# Patient Record
Sex: MALE | Race: WHITE | NOT HISPANIC OR LATINO | Employment: UNEMPLOYED | URBAN - METROPOLITAN AREA
[De-identification: names, ages, dates, MRNs, and addresses within clinical notes are randomized per-mention and may not be internally consistent; named-entity substitution may affect disease eponyms.]

---
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
## 2021-10-19 ENCOUNTER — APPOINTMENT (OUTPATIENT)
Dept: BEHAVIORAL HEALTH | Facility: PSYCHIATRIC FACILITY | Age: 21
End: 2021-10-19
Payer: COMMERCIAL

## 2021-11-02 ENCOUNTER — OFFICE VISIT (OUTPATIENT)
Dept: BEHAVIORAL HEALTH | Facility: PSYCHIATRIC FACILITY | Age: 21
End: 2021-11-02
Payer: COMMERCIAL

## 2021-11-02 VITALS — WEIGHT: 160 LBS

## 2021-11-02 DIAGNOSIS — F41.1 GENERALIZED ANXIETY DISORDER: ICD-10-CM

## 2021-11-02 DIAGNOSIS — F31.64 SEVERE MIXED BIPOLAR I DISORDER WITH PSYCHOTIC FEATURES (HCC): ICD-10-CM

## 2021-11-02 PROBLEM — Z51.81 ENCOUNTER FOR THERAPEUTIC DRUG MONITORING: Status: ACTIVE | Noted: 2021-06-15

## 2021-11-02 PROBLEM — G47.00 INSOMNIA: Status: ACTIVE | Noted: 2021-01-05

## 2021-11-02 PROCEDURE — 99999 PR NO CHARGE: CPT | Performed by: STUDENT IN AN ORGANIZED HEALTH CARE EDUCATION/TRAINING PROGRAM

## 2021-11-02 RX ORDER — LAMOTRIGINE 200 MG/1
200 TABLET ORAL DAILY
COMMUNITY
Start: 2021-10-14 | End: 2021-11-02 | Stop reason: SDUPTHER

## 2021-11-02 RX ORDER — LITHIUM CARBONATE 300 MG/1
300 TABLET, FILM COATED, EXTENDED RELEASE ORAL
COMMUNITY
Start: 2021-10-06 | End: 2021-11-02 | Stop reason: SDUPTHER

## 2021-11-02 RX ORDER — QUETIAPINE 200 MG/1
200 TABLET, FILM COATED, EXTENDED RELEASE ORAL DAILY
Qty: 30 TABLET | Refills: 1 | Status: SHIPPED | OUTPATIENT
Start: 2021-11-02 | End: 2022-02-14 | Stop reason: SDUPTHER

## 2021-11-02 RX ORDER — QUETIAPINE 200 MG/1
200 TABLET, FILM COATED, EXTENDED RELEASE ORAL DAILY
COMMUNITY
Start: 2021-10-15 | End: 2021-11-02 | Stop reason: SDUPTHER

## 2021-11-02 RX ORDER — QUETIAPINE FUMARATE 400 MG/1
400 TABLET, FILM COATED ORAL
COMMUNITY
Start: 2021-09-21 | End: 2021-11-02 | Stop reason: SDUPTHER

## 2021-11-02 RX ORDER — QUETIAPINE FUMARATE 400 MG/1
400 TABLET, FILM COATED ORAL
Qty: 30 TABLET | Refills: 1 | Status: SHIPPED | OUTPATIENT
Start: 2021-11-02 | End: 2022-02-03

## 2021-11-02 RX ORDER — LAMOTRIGINE 200 MG/1
200 TABLET ORAL DAILY
Qty: 30 TABLET | Refills: 0 | Status: SHIPPED | OUTPATIENT
Start: 2021-11-02 | End: 2022-02-14 | Stop reason: SDUPTHER

## 2021-11-02 RX ORDER — LITHIUM CARBONATE 300 MG/1
300 TABLET, FILM COATED, EXTENDED RELEASE ORAL
Qty: 30 TABLET | Refills: 0 | Status: SHIPPED | OUTPATIENT
Start: 2021-11-02 | End: 2022-01-03

## 2021-11-02 NOTE — PROGRESS NOTES
"[facility]  Medication Management Note    Evaluation completed by: Alan Foley D.O.   Date of Service: 11/02/21   Appointment type: in-office appointment.        CHIEF COMPLAINT/REASON FOR VISIT  Medication management and follow-up    HISTORY OF PRESENT ILLNESS  Jerome La is a 20 y.o. old male who presents today for follow up management of bipolar and anxiety.   Pt was last seen on 9/21/2021, at which time the plan was to take Seroquel X are 20 mg daily in the morning and Seroquel  mg at bedtime, with Lamictal 200 mg daily for mood, and lithium  mg at bedtime for mood.  Also plan to start propranolol 10 mg as needed for anxiety related to going to work.  Patient reports that he has been effectively working for the past month and a half.  Has had no issues or behavioral outbursts at work.  He gets along with his coworkers and has had no anger outbursts.  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  Reports work as being stressful, but likes helping customers.  Does not like when his coworkers do not listen to them and does not like a change in routine and his works schedule.  Reports difficulty struggling to adapt to change when there is no structure provided.    Patient took propranolol 2 or 3 times prior to work for his anxiety, he reports that it helped, but he states that his anxiety is in anticipation of working as soon as he walks to the door leaves.  Thus he reports stopping propranolol as he feels like he does not need it.  Patient discusses benefits of stopping Lamictal.  Discussed with patient that this is a mood stabilization medication, and has helped reduce periods of elevated or irritable mood with associated agitation.  Patient states that he is also considering therapy.  Discussed therapy with " patient, offered him therapy here at [facility], patient asked for other options.  Provided resource sheet with other therapy options.  Patient states he will think about starting therapy here at [facility] or elsewhere.  Patient denies any current homicidal thoughts.      PSYCHOSOCIAL CHANGES SINCE LAST VISIT   Patient has been working at [employer] for the past month and a half.    CURRENT MEDICATIONS, ADHERENCE, AND SIDE EFFECTS   • Seroquel  mg every morning and Seroquel  mg nightly  • Lamictal 200 mg daily  • Lithium  mg nightly      PSYCHIATRIC REVIEW OF SYSTEMS  Depression: Denies depression, reports good mood  Angelita: Denies elevated/irritable mood, grandiosity, increased goal-directed activity, decreased need for sleep, increased agitation or aggression  Psychosis: No auditory or visual hallucinations  Anxiety: Patient is anxious about going to work, anxiety improves when he walks through the door.        MEDICAL REVIEW OF SYSTEMS  ROS      ALLERGIES  No Known Allergies     PAST PSYCHIATRIC HISTORY  Past outpatient psychiatrist: Dr. Obrien at [facility]  Therapy: Has had therapy in the past, states it was helpful  Hospitalizations: [place] x2 in 2018.  One time for 1 night.  One time for 2 weeks.  These were both related to increased agitation and aggression and threats of homicide.  Suicide attempts: 1 in 2018    SOCIAL HISTORY SUMMARY  Lives with mother and brother in an apartment  Developmental history: Normal, was homeschooled  Has close friends in [place], not many here  Education: Was homeschooled but graduated high school.  Employment: Is currently working at [employer], this is his first job.  Wants to direct movies.  Currently saving money for a film camera.  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   No  "past surgical history on file.         FAMILY PSYCHIATRIC HISTORY  Dad is bipolar (not diagnosed), grandma has borderline personality disorder, cousin with schizophrenia, brother has self-harm/depression/ADHD.  Cousin and aunt have attempted suicide.    FAMILY MEDICAL HISTORY  Dad had heart problems and stroke    PHYSICAL EXAMINATION  Vital signs: Wt 72.6 kg (160 lb)   Musculoskeletal: Gait is normal. No gross abnormalities noted.   Abnormal movements: Were not noted    MENTAL STATUS EXAMINATION    General: Jerome La appears stated age.  And exhibits grooming which is appropriate and casual.  Hygiene is good.  Patient has dark brown shoulderlength hair.  Behavior: Pt is calm and cooperative with interview.  In no apparent distress.  Eye contact is poor.  Psychomotor: Psychomotor agitation or retardation is not noted.  Tics or tremors are not noted.  Speech: Moderate rate, tone.  Loud volume  Language: Fluent English  Mood: \"Fine\".  Per patient report  Affect: Flexible, Full range and Congruent with content  Thought Process: Logical and Goal-directed  Thought Content: denies suicidal ideation, denies homicidal ideation. Within normal limits  Perception: denies auditory hallucinations, denies visual hallucinations. No delusions noted on interview.    Attention span and concentration: Attentive to interview  Orientation: Alert  Recent and remote memory: No gross evidence of memory deficits  Insight: Good  Judgment: Good         CURRENT RISK ASSESSMENT       Suicide: Low       Homicide: Low       Self-Harm: Low       Relapse: Not applicable       Crisis Safety Plan Reviewed Not Indicated    [place]   reviewed.  No concerns about misuse of controlled substance.    ASSESSMENT  Jerome La is a 20 y.o. old male presenting for follow up management of bipolar disorder and anxiety.  Patient recently started his first job, and outpatient anxiety is increased prior to work, it is improved when he is at work " and busy working.  He has had significant behavioral outbursts of aggression in the past, these have not surfaced while at work.  He has had compliments given to him by his boss indicating good and adequate performance.  Patient's considerations for reducing medications taken are noted, however explained the patient that with recent job and potential change in looking for new job I am hesitant to change his current scheduled medication regimen as he has been having success in areas where he has not in the past.  Patient is in agreement, and agrees we should not change medication.    Today, will plan to continue current medication regimen.    DIAGNOSES/PLAN  Problem 1: Bipolar 1 disorder  • Medications: Lamictal 200 mg daily.  Seroquel  mg every morning and Seroquel  mg nightly.  Lithium  mg p.m.  • Psychotherapy: Patient is considering therapy, would like to explore alternative therapy sources outside of [facility].  We will follow-up at next visit if he would like to establish therapy at this clinic or otherwise.  • Labs/studies: We will order CMP, CBC, TSH, and lithium level      Problem 2: Generalized anxiety disorder  • Plan as above.  Will stop propranolol.        • Medication options, alternatives (including no medications) and medication risks/benefits/side effects were discussed in detail.  • The patient was advised to call, message clinician on [facility], or come in to the clinic if symptoms worsen or if questions/issues regarding their medications arise.  The patient verbalized understanding and agreement.    • The patient was educated to call 911, call the suicide hotline, or go to the local ER if having thoughts of suicide or homicide.  The patient verbalized understanding and agreement.   • The proposed treatment plan was discussed with the patient who was provided the opportunity to ask questions and make suggestions regarding alternative treatment. Patient verbalized understanding and  expressed agreement with the plan.      Return to clinic in 4 weeks or sooner if symptoms worsen.    This appointment was supervised by attending psychiatrist, Amilcar Tai MD, who agrees with assessment and treatment plan.  See attending attestation for more details.       Alan Foley D.O.  11/02/21

## 2022-01-03 DIAGNOSIS — F31.64 SEVERE MIXED BIPOLAR I DISORDER WITH PSYCHOTIC FEATURES (HCC): ICD-10-CM

## 2022-01-03 RX ORDER — LITHIUM CARBONATE 300 MG/1
TABLET, FILM COATED, EXTENDED RELEASE ORAL
Qty: 30 TABLET | Refills: 0 | Status: SHIPPED | OUTPATIENT
Start: 2022-01-03 | End: 2022-02-01

## 2022-02-01 DIAGNOSIS — F31.64 SEVERE MIXED BIPOLAR I DISORDER WITH PSYCHOTIC FEATURES (HCC): ICD-10-CM

## 2022-02-01 RX ORDER — LITHIUM CARBONATE 300 MG/1
TABLET, FILM COATED, EXTENDED RELEASE ORAL
Qty: 30 TABLET | Refills: 0 | Status: SHIPPED | OUTPATIENT
Start: 2022-02-01 | End: 2022-02-14 | Stop reason: SDUPTHER

## 2022-02-02 DIAGNOSIS — F31.64 SEVERE MIXED BIPOLAR I DISORDER WITH PSYCHOTIC FEATURES (HCC): ICD-10-CM

## 2022-02-03 RX ORDER — QUETIAPINE FUMARATE 400 MG/1
TABLET, FILM COATED ORAL
Qty: 30 TABLET | Refills: 1 | Status: SHIPPED | OUTPATIENT
Start: 2022-02-03 | End: 2022-02-14 | Stop reason: SDUPTHER

## 2022-02-14 ENCOUNTER — OFFICE VISIT (OUTPATIENT)
Dept: BEHAVIORAL HEALTH | Facility: PSYCHIATRIC FACILITY | Age: 22
End: 2022-02-14
Payer: COMMERCIAL

## 2022-02-14 VITALS — WEIGHT: 162.4 LBS

## 2022-02-14 DIAGNOSIS — Z51.81 ENCOUNTER FOR THERAPEUTIC DRUG MONITORING: ICD-10-CM

## 2022-02-14 DIAGNOSIS — F41.1 GENERALIZED ANXIETY DISORDER: ICD-10-CM

## 2022-02-14 DIAGNOSIS — F31.64 SEVERE MIXED BIPOLAR I DISORDER WITH PSYCHOTIC FEATURES (HCC): ICD-10-CM

## 2022-02-14 PROCEDURE — 99214 OFFICE O/P EST MOD 30 MIN: CPT | Mod: GC | Performed by: STUDENT IN AN ORGANIZED HEALTH CARE EDUCATION/TRAINING PROGRAM

## 2022-02-14 RX ORDER — LITHIUM CARBONATE 300 MG/1
300 TABLET, FILM COATED, EXTENDED RELEASE ORAL
Qty: 30 TABLET | Refills: 0 | Status: SHIPPED | OUTPATIENT
Start: 2022-02-14 | End: 2022-03-14 | Stop reason: SDUPTHER

## 2022-02-14 RX ORDER — LAMOTRIGINE 200 MG/1
200 TABLET ORAL DAILY
Qty: 30 TABLET | Refills: 0 | Status: SHIPPED | OUTPATIENT
Start: 2022-02-14 | End: 2022-04-18 | Stop reason: SDUPTHER

## 2022-02-14 RX ORDER — QUETIAPINE 200 MG/1
200 TABLET, FILM COATED, EXTENDED RELEASE ORAL DAILY
Qty: 30 TABLET | Refills: 1 | Status: SHIPPED | OUTPATIENT
Start: 2022-02-14 | End: 2022-04-18 | Stop reason: SDUPTHER

## 2022-02-14 RX ORDER — QUETIAPINE FUMARATE 400 MG/1
400 TABLET, FILM COATED ORAL
Qty: 30 TABLET | Refills: 1 | Status: SHIPPED | OUTPATIENT
Start: 2022-02-14 | End: 2022-03-14

## 2022-02-14 ASSESSMENT — ENCOUNTER SYMPTOMS
WEAKNESS: 0
DEPRESSION: 0
SHORTNESS OF BREATH: 0
DIARRHEA: 0
HEADACHES: 0
DIZZINESS: 0
CHILLS: 0
CONSTIPATION: 0
LOSS OF CONSCIOUSNESS: 0
ABDOMINAL PAIN: 0
HALLUCINATIONS: 0
MYALGIAS: 0
SEIZURES: 0
FEVER: 0
PALPITATIONS: 0
NAUSEA: 0
NERVOUS/ANXIOUS: 0

## 2022-02-15 NOTE — PROGRESS NOTES
"[facility]  Medication Management Note    Evaluation completed by: Alan Foley D.O.   Date of Service: 2/14/22   Appointment type: in-office appointment.        CHIEF COMPLAINT/REASON FOR VISIT  Medication management and follow-up    HISTORY OF PRESENT ILLNESS  Jerome La is a 21 y.o. old male who presents today for follow up management of bipolar and anxiety.   Pt was last seen on 11/2/2021, at which time the plan was to continue Seroquel XR are 200 mg in the morning and Seroquel  mg at bedtime, with Lamictal 200 mg daily for mood, and lithium  mg at bedtime for mood.      Pt quit work in December. He was becoming very upset and angry with his coworkers and \"it wasn't worth my time\". He is spending his time doing other things like \"music, art, and writing\". He quite because he was anxious. Quitting helped his anxiety. He did not get his lab work done, he has an appointment scheduled for later this week.   Pt has been taking a reduced amount of Seroquel in the evenings. \"My anxiety and shit is so bad right now\". He is frustrated with his mental health issues. He feels like he is not supporting/contributing. His mom is supportive. Patient denies AVH. He denies current SI and denies HI.    PSYCHOSOCIAL CHANGES SINCE LAST VISIT   Patient quit work in December 2021. Has not been working since. Doesn't know what he would like to do.    CURRENT MEDICATIONS, ADHERENCE, AND SIDE EFFECTS   • Seroquel  mg   • Seroquel  mg nightly, has been taking 200mg (instead of 400mg)  • Lamictal 200 mg daily  • Lithium  mg nightly      PSYCHIATRIC REVIEW OF SYSTEMS  Depression: Denies depression, reports good mood  Angelita: is more irritable and more easily upset; denies grandiosity, increased goal-directed activity, decreased need for sleep. No physical or verbal aggression.  Psychosis: No auditory or visual hallucinations  Anxiety: Patient is anxious, this is " non-specific  PTSD: no trauma      MEDICAL REVIEW OF SYSTEMS  Review of Systems   Constitutional: Negative for chills and fever.   HENT: Negative for tinnitus.    Respiratory: Negative for shortness of breath.    Cardiovascular: Negative for chest pain and palpitations.   Gastrointestinal: Negative for abdominal pain, constipation, diarrhea and nausea.   Musculoskeletal: Negative for joint pain and myalgias.   Neurological: Negative for dizziness, seizures, loss of consciousness, weakness and headaches.   Psychiatric/Behavioral: Negative for depression, hallucinations and suicidal ideas. The patient is not nervous/anxious.          ALLERGIES  No Known Allergies     PAST PSYCHIATRIC HISTORY  Past outpatient psychiatrist: Dr. Obrien at [facility]  Therapy: Has had therapy in the past, states it was helpful  Hospitalizations: [place] x2 in 2018.  One time for 1 night.  One time for 2 weeks.  These were both related to increased agitation and aggression and threats of homicide.  Suicide attempts: 1 in 2018    SOCIAL HISTORY SUMMARY  Lives with mother and brother in an apartment  Developmental history: Normal, was homeschooled  Has close friends in [place], not many here  Education: Was homeschooled but graduated high school.  Employment: Is currently working at [employer], this is his first job.  Wants to direct movies.  Currently saving money for a film camera.  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   History reviewed. No pertinent surgical history.         FAMILY PSYCHIATRIC HISTORY  Dad is bipolar (not diagnosed), grandma has borderline personality disorder, cousin with schizophrenia, brother has self-harm/depression/ADHD.  Cousin and aunt have attempted suicide.    FAMILY MEDICAL HISTORY  Dad had heart problems and stroke    PHYSICAL  "EXAMINATION  Vital signs: Wt 73.7 kg (162 lb 6.4 oz)   Musculoskeletal: Gait is normal. No gross abnormalities noted.   Abnormal movements: Were not noted    MENTAL STATUS EXAMINATION    General: Jerome La appears stated age.  And exhibits grooming which is appropriate and casual.  Hygiene is good.  Patient has dark brown shoulderlength hair.  Behavior: Pt is mostly calm and cooperative with interview.  Not in acute distress.  Eye contact is poor.  Psychomotor: Appears somewhat agitated, is gesticulating with his hands.  Tics or tremors are not noted.  Speech: Moderate rate, tone.  Loud volume  Language: Fluent English  Mood: \"Complex...Embarassment\".  Per patient report  Affect: Flexible, Full range and Congruent with content  Thought Process: Logical and Goal-directed  Thought Content: denies suicidal ideation, denies homicidal ideation. Within normal limits  Perception: denies auditory hallucinations, denies visual hallucinations. No delusions noted on interview.    Attention span and concentration: Attentive to interview  Orientation: Alert  Recent and remote memory: No gross evidence of memory deficits  Insight: Good  Judgment: Good         CURRENT RISK ASSESSMENT       Suicide: Low       Homicide: Low       Self-Harm: Low       Relapse: Not applicable       Crisis Safety Plan Reviewed Not Indicated    [place]  records   [place].  No concerns about misuse of controlled substance.    ASSESSMENT  Jerome La is a 20 y.o. old male presenting for follow up management of bipolar disorder and anxiety.  Patient quit his job as he became increasingly frustrated with his interactions with others. It is evident that pt may have a somewhat labile mood that is sensitive to his perceptions of how others view him. Pt began self decreasing Seroquel since our last appointment and has noted increased anxiety, agitation, and mood instability. He has been taking other medications as prescribed. Presents today as a " little more labile and irritable. It would be beneficial to see pt on a more regular basis and for longer amounts of time, I conveyed this to pt today.     Today, will plan to continue current medication regimen. Instructed pt to continue to take Seroquel 400mg at night in addition to the 200mg of XR formulation he takes in the morning.  Pt is at high risk of relapse/zafar with discontinuation of medication thus will consider any changes to medication carefully with pts full and informed consent. Added lithium level to pts baseline labs.    DIAGNOSES/PLAN  Problem 1: Bipolar 1 disorder  • Medications: Lamictal 200 mg daily.  Seroquel  mg every morning and Seroquel  mg nightly.  Lithium  mg p.m.  •   • Labs/studies: CMP, CBC, TSH, and lithium level      Problem 2: Generalized anxiety disorder  • Plan as above.      • Medication options, alternatives (including no medications) and medication risks/benefits/side effects were discussed in detail.  • The patient was advised to call, message clinician on [facility], or come in to the clinic if symptoms worsen or if questions/issues regarding their medications arise.  The patient verbalized understanding and agreement.    • The patient was educated to call 911, call the suicide hotline, or go to the local ER if having thoughts of suicide or homicide.  The patient verbalized understanding and agreement.   • The proposed treatment plan was discussed with the patient who was provided the opportunity to ask questions and make suggestions regarding alternative treatment. Patient verbalized understanding and expressed agreement with the plan.      Return to clinic in 4 weeks or sooner if symptoms worsen.    This appointment was supervised by attending psychiatrist, Mitchel South MD, who agrees with assessment and treatment plan.  See attending attestation for more details.       Alan Foley D.O.  2/14/22

## 2022-03-14 ENCOUNTER — OFFICE VISIT (OUTPATIENT)
Dept: BEHAVIORAL HEALTH | Facility: PSYCHIATRIC FACILITY | Age: 22
End: 2022-03-14
Payer: COMMERCIAL

## 2022-03-14 VITALS — WEIGHT: 163.4 LBS

## 2022-03-14 DIAGNOSIS — F31.64 SEVERE MIXED BIPOLAR I DISORDER WITH PSYCHOTIC FEATURES (HCC): ICD-10-CM

## 2022-03-14 DIAGNOSIS — F41.1 GENERALIZED ANXIETY DISORDER: ICD-10-CM

## 2022-03-14 PROCEDURE — 99214 OFFICE O/P EST MOD 30 MIN: CPT | Performed by: STUDENT IN AN ORGANIZED HEALTH CARE EDUCATION/TRAINING PROGRAM

## 2022-03-14 RX ORDER — LITHIUM CARBONATE 300 MG/1
300 TABLET, FILM COATED, EXTENDED RELEASE ORAL
Qty: 30 TABLET | Refills: 0 | Status: SHIPPED | OUTPATIENT
Start: 2022-03-14 | End: 2022-04-18 | Stop reason: SDUPTHER

## 2022-03-14 RX ORDER — QUETIAPINE FUMARATE 200 MG/1
200 TABLET, FILM COATED ORAL NIGHTLY
Qty: 30 TABLET | Refills: 0 | Status: SHIPPED | OUTPATIENT
Start: 2022-03-14 | End: 2022-04-11

## 2022-03-14 RX ORDER — PROPRANOLOL HYDROCHLORIDE 10 MG/1
10 TABLET ORAL PRN
Qty: 20 TABLET | Refills: 0 | Status: SHIPPED | OUTPATIENT
Start: 2022-03-14 | End: 2022-04-18 | Stop reason: SDUPTHER

## 2022-03-14 ASSESSMENT — ENCOUNTER SYMPTOMS
NERVOUS/ANXIOUS: 0
ABDOMINAL PAIN: 0
WEAKNESS: 0
HALLUCINATIONS: 0
SHORTNESS OF BREATH: 0
SEIZURES: 0
PALPITATIONS: 0
CONSTIPATION: 0
DIARRHEA: 0
DEPRESSION: 0
FEVER: 0
NAUSEA: 0
HEADACHES: 0
MYALGIAS: 0
LOSS OF CONSCIOUSNESS: 0
CHILLS: 0
DIZZINESS: 0

## 2022-03-14 NOTE — PROGRESS NOTES
[facility]  Medication Management Note    Evaluation completed by: Alan Foley D.O.   Date of Service: 3/14/22   Appointment type: in-office appointment.        CHIEF COMPLAINT/REASON FOR VISIT  Medication management and follow-up    HISTORY OF PRESENT ILLNESS  Jerome La is a 21 y.o. old male who presents today for follow up management of bipolar and anxiety.   Pt was last seen on 2/14/2022, at which time the plan was to continue Seroquel XR are 200 mg in the morning and Seroquel  mg at bedtime, with Lamictal 200 mg daily for mood, and lithium  mg at bedtime for mood.      Patient mother, Halle, is present for this interview with patient's consent.  States she is concerned that South is not communicating properly his medications and there effects.  Patient reports that he had not been taking 400 mg of Seroquel at bedtime prior to the last appointment, and that when he took 400 mg after last appointment he felt very tired, fatigued, and lazy.  Halle indicated he has been taking Seroquel  mg in the morning and Seroquel  mg in p.m. states he also clarifies that patient is taking 100 mg of lamotrigine daily, not 200 mg as indicated by patient previously.  Patient is taking 300 mg of lithium at night.    Patient and mother both note that he has been anxious about a variety of medical problems lately.  He has been anxious about many of these issues and is wanting to set up appointments with other doctors.  He has been concerned about his blood tests and what they may result.  He notes that his anxiety is heightened before any activity when he leaves the house and this is been getting worse lately.  Mother states he notes that it is generally worse as he leaves the house. He would like to consider medication that may help with this.    Patient denies AVH. He denies current SI and denies HI.    PSYCHOSOCIAL CHANGES SINCE LAST VISIT   Patient quit work in December  2021. Has not been working since. Doesn't know what he would like to do.    CURRENT MEDICATIONS, ADHERENCE, AND SIDE EFFECTS   • Seroquel  mg   • Seroquel  mg nightly, has been taking 200mg   • Lamictal 100 mg daily  • Lithium  mg nightly      PSYCHIATRIC REVIEW OF SYSTEMS  Depression: Denies depression, reports good mood  Angelita: is more irritable and more easily upset; denies grandiosity, increased goal-directed activity, decreased need for sleep. No physical or verbal aggression.  Psychosis: No auditory or visual hallucinations  Anxiety: Patient is anxious, recently regarding medical issues and leaving house  PTSD: no trauma      MEDICAL REVIEW OF SYSTEMS  Review of Systems   Constitutional: Negative for chills and fever.   HENT: Negative for tinnitus.    Respiratory: Negative for shortness of breath.    Cardiovascular: Negative for chest pain and palpitations.   Gastrointestinal: Negative for abdominal pain, constipation, diarrhea and nausea.   Musculoskeletal: Negative for joint pain and myalgias.   Neurological: Negative for dizziness, seizures, loss of consciousness, weakness and headaches.   Psychiatric/Behavioral: Negative for depression, hallucinations and suicidal ideas. The patient is not nervous/anxious.          ALLERGIES  No Known Allergies     PAST PSYCHIATRIC HISTORY  Past outpatient psychiatrist: Dr. Obrien at [facility]  Therapy: Has had therapy in the past, states it was helpful  Hospitalizations: [facility] x2 in 2018.  One time for 1 night.  One time for 2 weeks.  These were both related to increased agitation and aggression and threats of homicide.  Suicide attempts: 1 in 2018    SOCIAL HISTORY SUMMARY  Lives with mother and brother in an apartment  Developmental history: Normal, was homeschooled  Has close friends in [place], not many here  Education: Was homeschooled but graduated high school.  Employment: Is currently working at [employer], this is his first job.  Wants to direct  "movies.  Currently saving money for a film camera.  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   No past surgical history on file.         FAMILY PSYCHIATRIC HISTORY  Dad is bipolar (not diagnosed), grandma has borderline personality disorder, cousin with schizophrenia, brother has self-harm/depression/ADHD.  Cousin and aunt have attempted suicide.    FAMILY MEDICAL HISTORY  Dad had heart problems and stroke    PHYSICAL EXAMINATION  Vital signs: There were no vitals taken for this visit.  Musculoskeletal: Gait is normal. No gross abnormalities noted.   Abnormal movements: Were not noted    MENTAL STATUS EXAMINATION    General: Jerome La appears stated age.  And exhibits grooming which is appropriate and casual.  Hygiene is good.  Patient has dark brown shoulderlength hair.  Behavior: Pt is mostly calm and cooperative with interview.  Not in acute distress.  Eye contact is poor.  Psychomotor: Appears somewhat agitated, is gesticulating with his hands.  Tics or tremors are not noted.  Speech: Moderate rate, tone.  Loud volume  Language: Fluent English  Mood: \"Okay\".  Per patient report  Affect: Flexible, Full range and Congruent with content  Thought Process: Logical and Goal-directed  Thought Content: denies suicidal ideation, denies homicidal ideation. Within normal limits  Perception: denies auditory hallucinations, denies visual hallucinations. No delusions noted on interview.    Attention span and concentration: Attentive to interview  Orientation: Alert  Recent and remote memory: No gross evidence of memory deficits  Insight: Good  Judgment: Good         CURRENT RISK ASSESSMENT       Suicide: Low       Homicide: Low       Self-Harm: Low       Relapse: Not applicable       Crisis Safety Plan Reviewed Not Indicated    [place]  " records   reviewed.  No concerns about misuse of controlled substance.    ASSESSMENT  Jerome La is a 20 y.o. old male presenting for follow up management of bipolar disorder and anxiety.  Patient with difficulty remembering medications that he is taking, his mother likely helps ensure he takes his medications correctly.  Mother attended appointment today and clarified how much of each medication he is taking.  We will adjust medications accordingly as patient's incorrect reporting of medication resulted in patient having excessive fatigue.    Today, will plan to continue to take Seroquel IR 200mg at night in addition to the 200mg of XR formulation he takes in the morning.  We will continue lamotrigine at 100 mg as this is what he has been taking according to his mother.  And will continue lithium  mg.  We will add propranolol 10 mg to be taken as needed before event that causes anxiety.    DIAGNOSES/PLAN  Problem 1: Bipolar 1 disorder  • Medications:   a. Continue Lamictal 100 mg daily.    b. ContinueSeroquel  mg every morning and Seroquel  mg nightly.    c. Continue Lithium  mg p.m.  •       Problem 2: Generalized anxiety disorder  • Plan as above.    • Additionally we will start propranolol 10 mg to be taken as needed 1 hour before event that anticipates will cause anxiety.      Most recent labs done on 3/8/22 and showed Lithium level <0.3, this is lower than typical therapeutic range, and is expected as Lithium was added as an adjunctive therapy for mood stability. All other labs obtained were within normal limits.      • Medication options, alternatives (including no medications) and medication risks/benefits/side effects were discussed in detail.  • The patient was advised to call, message clinician [facility], or come in to the clinic if symptoms worsen or if questions/issues regarding their medications arise.  The patient verbalized understanding and agreement.    • The  patient was educated to call 911, call the suicide hotline, or go to the local ER if having thoughts of suicide or homicide.  The patient verbalized understanding and agreement.   • The proposed treatment plan was discussed with the patient who was provided the opportunity to ask questions and make suggestions regarding alternative treatment. Patient verbalized understanding and expressed agreement with the plan.      Return to clinic in 4 weeks or sooner if symptoms worsen.    This appointment was supervised by attending psychiatrist, Mitchel South MD, who agrees with assessment and treatment plan.  See attending attestation for more details.       Alan Foley D.O.  3/14/22

## 2022-04-09 DIAGNOSIS — F31.64 SEVERE MIXED BIPOLAR I DISORDER WITH PSYCHOTIC FEATURES (HCC): ICD-10-CM

## 2022-04-11 ENCOUNTER — APPOINTMENT (OUTPATIENT)
Dept: BEHAVIORAL HEALTH | Facility: PSYCHIATRIC FACILITY | Age: 22
End: 2022-04-11
Payer: COMMERCIAL

## 2022-04-11 RX ORDER — QUETIAPINE FUMARATE 200 MG/1
TABLET, FILM COATED ORAL
Qty: 30 TABLET | Refills: 0 | Status: SHIPPED | OUTPATIENT
Start: 2022-04-11 | End: 2022-04-18 | Stop reason: SDUPTHER

## 2022-04-18 ENCOUNTER — OFFICE VISIT (OUTPATIENT)
Dept: BEHAVIORAL HEALTH | Facility: PSYCHIATRIC FACILITY | Age: 22
End: 2022-04-18
Payer: COMMERCIAL

## 2022-04-18 VITALS — WEIGHT: 163.6 LBS

## 2022-04-18 DIAGNOSIS — F41.1 GENERALIZED ANXIETY DISORDER: ICD-10-CM

## 2022-04-18 DIAGNOSIS — F31.64 SEVERE MIXED BIPOLAR I DISORDER WITH PSYCHOTIC FEATURES (HCC): ICD-10-CM

## 2022-04-18 PROCEDURE — 99213 OFFICE O/P EST LOW 20 MIN: CPT | Mod: GE | Performed by: STUDENT IN AN ORGANIZED HEALTH CARE EDUCATION/TRAINING PROGRAM

## 2022-04-18 RX ORDER — LITHIUM CARBONATE 300 MG/1
300 TABLET, FILM COATED, EXTENDED RELEASE ORAL
Qty: 30 TABLET | Refills: 1 | Status: SHIPPED | OUTPATIENT
Start: 2022-04-18 | End: 2022-05-16 | Stop reason: SDUPTHER

## 2022-04-18 RX ORDER — QUETIAPINE 200 MG/1
200 TABLET, FILM COATED, EXTENDED RELEASE ORAL DAILY
Qty: 30 TABLET | Refills: 1 | Status: SHIPPED | OUTPATIENT
Start: 2022-04-18 | End: 2022-09-12

## 2022-04-18 RX ORDER — PROPRANOLOL HYDROCHLORIDE 10 MG/1
10 TABLET ORAL PRN
Qty: 30 TABLET | Refills: 1 | Status: SHIPPED | OUTPATIENT
Start: 2022-04-18 | End: 2022-07-05

## 2022-04-18 RX ORDER — LAMOTRIGINE 100 MG/1
100 TABLET ORAL DAILY
Qty: 30 TABLET | Refills: 1 | Status: SHIPPED | OUTPATIENT
Start: 2022-04-18 | End: 2022-07-05

## 2022-04-18 RX ORDER — QUETIAPINE FUMARATE 200 MG/1
200 TABLET, FILM COATED ORAL EVERY EVENING
Qty: 30 TABLET | Refills: 1 | Status: SHIPPED | OUTPATIENT
Start: 2022-04-18 | End: 2022-08-16

## 2022-04-18 ASSESSMENT — ENCOUNTER SYMPTOMS
DIZZINESS: 0
MYALGIAS: 0
PALPITATIONS: 0
SHORTNESS OF BREATH: 0
NERVOUS/ANXIOUS: 0
NAUSEA: 0
HALLUCINATIONS: 0
HEADACHES: 0
DIARRHEA: 0
LOSS OF CONSCIOUSNESS: 0
ABDOMINAL PAIN: 0
WEAKNESS: 0
FEVER: 0
CONSTIPATION: 0
DEPRESSION: 0
CHILLS: 0
SEIZURES: 0

## 2022-04-19 NOTE — PROGRESS NOTES
"[facility]  Medication Management Note    Evaluation completed by: Alan Foley D.O.   Date of Service: 4/18/2022  Appointment type: in-office appointment.        CHIEF COMPLAINT/REASON FOR VISIT  Medication management and follow-up    HISTORY OF PRESENT ILLNESS  Jerome La is a 21 y.o. old male who presents today for follow up management of bipolar and anxiety.   Pt was last seen on 3/14/2022, at which time the plan was to continue Seroquel XR are 200 mg in the morning and Seroquel  mg at bedtime, with Lamictal 100 mg daily for mood, and lithium  mg at bedtime for mood, and start propranolol 10 mg as needed.    Patient is feeling tired today.  Patient reports he has been taking propranolol nearly every day for his anxiety.  He reports that it is \"helping very much\".  His anxiety is less, rates his anxiety as 3/10, 10 being the worst.  Reports as harder for his brain to think about things that make him anxious.  Denies feeling lightheaded, dizzy, or fainting, or other side effect.  He had a follow-up appointment to test his eyes, as that was a large concern for him at last appointment.  He has 20/20 vision and is very relieved.  He has been taking other medications as prescribed at doses prescribed.  Patient is considering therapy.    Patient reports that his mood has been stable, he has not had any angry outbursts recently towards family members or others.  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  Patient has several questions about psychopathy and autism.  He thinks he might be autistic, he gets along really well with other autistic people.  Patient denies SI/HI and AVH.      PSYCHOSOCIAL CHANGES SINCE LAST VISIT   Patient quit work in December 2021. Has not been working since. Doesn't know what he would like to do.    CURRENT MEDICATIONS, ADHERENCE, AND SIDE EFFECTS   • Seroquel "  mg   • Seroquel  mg nightly   • Lamictal 100 mg daily  • Lithium  mg nightly  • Propranolol 10 mg as needed      PSYCHIATRIC REVIEW OF SYSTEMS  Depression: Denies depression, reports good mood, sleep is good  Angelita: is less irritable and upset; denies grandiosity, increased goal-directed activity, decreased need for sleep. No physical or verbal aggression.  Psychosis: No auditory or visual hallucinations  Anxiety: Patient is less anxious about medical issues, no overt anxiety causing difficulty sleeping or increased muscle tension  PTSD: no trauma      MEDICAL REVIEW OF SYSTEMS  Review of Systems   Constitutional: Negative for chills and fever.   HENT: Negative for tinnitus.    Respiratory: Negative for shortness of breath.    Cardiovascular: Negative for chest pain and palpitations.   Gastrointestinal: Negative for abdominal pain, constipation, diarrhea and nausea.   Musculoskeletal: Negative for joint pain and myalgias.   Neurological: Negative for dizziness, seizures, loss of consciousness, weakness and headaches.   Psychiatric/Behavioral: Negative for depression, hallucinations and suicidal ideas. The patient is not nervous/anxious.          ALLERGIES  No Known Allergies     PAST PSYCHIATRIC HISTORY  Past outpatient psychiatrist: Dr. Obrien at [facility]  Therapy: Has had therapy in the past, states it was helpful  Hospitalizations: [place] x2 in 2018.  One time for 1 night.  One time for 2 weeks.  These were both related to increased agitation and aggression and threats of homicide.  Suicide attempts: 1 in 2018    SOCIAL HISTORY SUMMARY  Lives with mother and brother in an apartment  Developmental history: Normal, was homeschooled  Has close friends in [place], not many here  Education: Was homeschooled but graduated high school.  Employment: Is currently working at [employer], this is his first job.  Wants to direct movies.  Currently saving money for a film camera.  Has multiple movie scripts  "written that he would like to fill  Legal history: None  Hobbies: Likes music, writing, and fell  Access to firearms: No firearms in the home      MEDICAL HISTORY  No past medical history on file.   No past surgical history on file.         FAMILY PSYCHIATRIC HISTORY  Dad is bipolar (not diagnosed), grandma has borderline personality disorder, cousin with schizophrenia, brother has self-harm/depression/ADHD.  Cousin and aunt have attempted suicide.    FAMILY MEDICAL HISTORY  Dad had heart problems and stroke    PHYSICAL EXAMINATION  Vital signs: There were no vitals taken for this visit.  Musculoskeletal: Gait is normal. No gross abnormalities noted.   Abnormal movements: Were not noted    MENTAL STATUS EXAMINATION    General: Jerome La appears stated age.  And exhibits grooming which is appropriate and casual.  Hygiene is good.  Patient has dark brown shoulder length hair.  Behavior: Pt is mostly calm and cooperative with interview.  Not in acute distress.  Eye contact is poor.  Psychomotor: Patient makes several gesticulations with his hands.  Tics or tremors are not noted.  Speech: Moderate rate, tone.  Loud volume  Language: Fluent English  Mood: \"Stable\".  Per patient report  Affect: Flexible, Full range and Congruent with content  Thought Process: Logical and Goal-directed  Thought Content: denies suicidal ideation, denies homicidal ideation. Within normal limits  Perception: denies auditory hallucinations, denies visual hallucinations. No delusions noted on interview.    Attention span and concentration: Attentive to interview  Orientation: Alert  Recent and remote memory: No gross evidence of memory deficits  Insight: Good  Judgment: Good         CURRENT RISK ASSESSMENT       Suicide: Low       Homicide: Low       Self-Harm: Low       Relapse: Not applicable       Crisis Safety Plan Reviewed Not Indicated    [place]  records   [place].  No concerns about misuse of controlled " substance.    ASSESSMENT  Jerome La is a 20 y.o. old male presenting for follow up management of bipolar disorder and anxiety.  Patient is taking all medications as currently prescribed and as noted below.  Propranolol has been effective and has helped relieve much of his anxiety.  No noted side effects of starting propranolol.    Today, will plan to continue to take Seroquel IR 200mg at night in addition to the 200mg of XR formulation he takes in the morning.  We will continue lamotrigine at 100 mg as this is what he has been taking according to his mother.  And will continue lithium  mg.  We will add propranolol 10 mg to be taken as needed before event that causes anxiety.  We will consider moving forward if patient were to decompensate or present with increased depression with plan to increase Lamictal to 200.  Can also consider increasing lithium and decreasing Seroquel.    DIAGNOSES/PLAN  Problem 1: Bipolar 1 disorder  • Medications:   a. Continue Lamictal 100 mg daily.    b. ContinueSeroquel  mg every morning and Seroquel  mg nightly.    c. Continue Lithium  mg p.m.        Problem 2: Generalized anxiety disorder  • Plan as above.    • Continue propranolol 10 mg to be taken as needed 1 hour before event that anticipates will cause anxiety.      Most recent labs done on 3/8/22 and showed Lithium level <0.3, this is lower than typical therapeutic range, and is expected as Lithium was added as an adjunctive therapy for mood stability. All other labs obtained were within normal limits.      • Medication options, alternatives (including no medications) and medication risks/benefits/side effects were discussed in detail.  • The patient was advised to call, message clinician on [facility], or come in to the clinic if symptoms worsen or if questions/issues regarding their medications arise.  The patient verbalized understanding and agreement.    • The patient was educated to call 911, call  the suicide hotline, or go to the local ER if having thoughts of suicide or homicide.  The patient verbalized understanding and agreement.   • The proposed treatment plan was discussed with the patient who was provided the opportunity to ask questions and make suggestions regarding alternative treatment. Patient verbalized understanding and expressed agreement with the plan.      Return to clinic in 4 weeks or sooner if symptoms worsen.    This appointment was supervised by attending psychiatrist, Mitchel South MD, who agrees with assessment and treatment plan.  See attending attestation for more details.       Alan Foley D.O.  4/18/2022

## 2022-05-16 ENCOUNTER — APPOINTMENT (OUTPATIENT)
Dept: BEHAVIORAL HEALTH | Facility: PSYCHIATRIC FACILITY | Age: 22
End: 2022-05-16
Payer: COMMERCIAL

## 2022-05-16 DIAGNOSIS — F31.64 SEVERE MIXED BIPOLAR I DISORDER WITH PSYCHOTIC FEATURES (HCC): ICD-10-CM

## 2022-05-16 RX ORDER — LITHIUM CARBONATE 300 MG/1
300 TABLET, FILM COATED, EXTENDED RELEASE ORAL
Qty: 30 TABLET | Refills: 1 | Status: SHIPPED | OUTPATIENT
Start: 2022-05-16 | End: 2022-09-12

## 2022-05-16 NOTE — TELEPHONE ENCOUNTER
Received request via: Patient    Was the patient seen in the last year in this department? Yes    Does the patient have an active prescription (recently filled or refills available) for medication(s) requested? No     Patient will be needing refill soon for lithium until next appointment 6/13

## 2022-06-28 DIAGNOSIS — F31.64 SEVERE MIXED BIPOLAR I DISORDER WITH PSYCHOTIC FEATURES (HCC): ICD-10-CM

## 2022-06-28 DIAGNOSIS — F41.1 GENERALIZED ANXIETY DISORDER: ICD-10-CM

## 2022-07-05 RX ORDER — PROPRANOLOL HYDROCHLORIDE 10 MG/1
TABLET ORAL
Qty: 30 TABLET | Refills: 1 | Status: SHIPPED | OUTPATIENT
Start: 2022-07-05 | End: 2023-09-20 | Stop reason: SDUPTHER

## 2022-07-05 RX ORDER — LAMOTRIGINE 100 MG/1
100 TABLET ORAL DAILY
Qty: 30 TABLET | Refills: 1 | Status: SHIPPED | OUTPATIENT
Start: 2022-07-05 | End: 2022-09-26 | Stop reason: DRUGHIGH

## 2022-08-01 ENCOUNTER — APPOINTMENT (OUTPATIENT)
Dept: BEHAVIORAL HEALTH | Facility: PSYCHIATRIC FACILITY | Age: 22
End: 2022-08-01
Payer: COMMERCIAL

## 2022-08-16 DIAGNOSIS — F31.64 SEVERE MIXED BIPOLAR I DISORDER WITH PSYCHOTIC FEATURES (HCC): ICD-10-CM

## 2022-08-16 RX ORDER — QUETIAPINE FUMARATE 200 MG/1
TABLET, FILM COATED ORAL
Qty: 90 TABLET | Refills: 1 | Status: SHIPPED | OUTPATIENT
Start: 2022-08-16 | End: 2023-02-22 | Stop reason: SDUPTHER

## 2022-09-12 ENCOUNTER — APPOINTMENT (OUTPATIENT)
Dept: BEHAVIORAL HEALTH | Facility: PSYCHIATRIC FACILITY | Age: 22
End: 2022-09-12
Payer: COMMERCIAL

## 2022-09-12 DIAGNOSIS — F31.64 SEVERE MIXED BIPOLAR I DISORDER WITH PSYCHOTIC FEATURES (HCC): ICD-10-CM

## 2022-09-12 RX ORDER — LITHIUM CARBONATE 300 MG/1
TABLET, FILM COATED, EXTENDED RELEASE ORAL
Qty: 30 TABLET | Refills: 1 | Status: SHIPPED | OUTPATIENT
Start: 2022-09-12 | End: 2022-10-11

## 2022-09-12 RX ORDER — QUETIAPINE 200 MG/1
TABLET, FILM COATED, EXTENDED RELEASE ORAL
Qty: 30 TABLET | Refills: 1 | Status: SHIPPED | OUTPATIENT
Start: 2022-09-12 | End: 2022-11-15

## 2022-09-26 ENCOUNTER — APPOINTMENT (OUTPATIENT)
Dept: BEHAVIORAL HEALTH | Facility: PSYCHIATRIC FACILITY | Age: 22
End: 2022-09-26
Payer: COMMERCIAL

## 2022-09-26 VITALS
OXYGEN SATURATION: 98 % | HEART RATE: 118 BPM | DIASTOLIC BLOOD PRESSURE: 74 MMHG | WEIGHT: 158.2 LBS | HEIGHT: 74 IN | BODY MASS INDEX: 20.3 KG/M2 | SYSTOLIC BLOOD PRESSURE: 112 MMHG

## 2022-09-26 DIAGNOSIS — F31.64 SEVERE MIXED BIPOLAR I DISORDER WITH PSYCHOTIC FEATURES (HCC): ICD-10-CM

## 2022-09-26 DIAGNOSIS — Z51.81 ENCOUNTER FOR THERAPEUTIC DRUG MONITORING: ICD-10-CM

## 2022-09-26 PROCEDURE — 99213 OFFICE O/P EST LOW 20 MIN: CPT | Mod: GE | Performed by: STUDENT IN AN ORGANIZED HEALTH CARE EDUCATION/TRAINING PROGRAM

## 2022-09-26 RX ORDER — LAMOTRIGINE 25 MG/1
TABLET ORAL
Qty: 42 TABLET | Refills: 0 | Status: SHIPPED | OUTPATIENT
Start: 2022-09-26 | End: 2022-10-24

## 2022-09-26 ASSESSMENT — ENCOUNTER SYMPTOMS
LOSS OF CONSCIOUSNESS: 0
NAUSEA: 0
DEPRESSION: 0
WEAKNESS: 0
CHILLS: 0
HEADACHES: 0
MYALGIAS: 0
FEVER: 0
ABDOMINAL PAIN: 0
DIZZINESS: 0
PALPITATIONS: 0
DIARRHEA: 0
SEIZURES: 0
HALLUCINATIONS: 0
CONSTIPATION: 0
SHORTNESS OF BREATH: 0
NERVOUS/ANXIOUS: 0

## 2022-09-26 NOTE — PROGRESS NOTES
"[facility]  Medication Management Note    Evaluation completed by: Alan Foley D.O.   Date of Service: 9/26/2022  Appointment type: in-office appointment.        CHIEF COMPLAINT/REASON FOR VISIT  Medication management and follow-up    HISTORY OF PRESENT ILLNESS  Jerome La is a 21 y.o. old male who presents today for follow up management of bipolar and anxiety.   Pt was last seen on 4/18/2022, at which time the plan was to continue Seroquel XR are 200 mg in the morning and Seroquel  mg at bedtime, with Lamictal 100 mg daily for mood, and lithium  mg at bedtime for mood.    Patient is now going to [employer], started this fall, is taking once one art class. Wants to go to art school in [place], at [employer]. Patient is not depressed, he reports that he typically finds himself \"on the manic side of the spectrum\" and does not really find himself depressed. Patient feels that Lamictal is not helpful and would be willing to decrease his dose. He is somewhat anxious of increasing Lithium at this time, and would like to make one change at a time. Reports that he would like to taper Lamictal starting today. Patient denies depressed mood. Also denies grandiosity, aggression and anger, flight of ideas, and elevated mood. Reports that mood is stable. Denies HI and SI. His anxiety has been present, but stable. He is considering doing therapy, he has a referral sheet of therapy providers. He thinks he is autistic and would like to do neuropsych testing for evaluation.      PSYCHOSOCIAL CHANGES SINCE LAST VISIT   Started taking one class at [employer], an art class.     CURRENT MEDICATIONS, ADHERENCE, AND SIDE EFFECTS   Seroquel  mg qAM  Seroquel  mg qHS   Lamictal 100 mg daily  Lithium  mg qHS  Propranolol 10 mg as needed      PSYCHIATRIC REVIEW OF SYSTEMS  Depression: Denies depression, reports good mood, sleep is good  Angelita: is less irritable and upset; " denies grandiosity, increased goal-directed activity, decreased need for sleep. No physical or verbal aggression.  Psychosis: No auditory or visual hallucinations  Anxiety: Patient is less anxious about medical issues, no overt anxiety causing difficulty sleeping or increased muscle tension  PTSD: no trauma      MEDICAL REVIEW OF SYSTEMS  Review of Systems   Constitutional:  Negative for chills and fever.   HENT:  Negative for tinnitus.    Respiratory:  Negative for shortness of breath.    Cardiovascular:  Negative for chest pain and palpitations.   Gastrointestinal:  Negative for abdominal pain, constipation, diarrhea and nausea.   Musculoskeletal:  Negative for joint pain and myalgias.   Neurological:  Negative for dizziness, seizures, loss of consciousness, weakness and headaches.   Psychiatric/Behavioral:  Negative for depression, hallucinations and suicidal ideas. The patient is not nervous/anxious.        ALLERGIES  No Known Allergies     PAST PSYCHIATRIC HISTORY  Past outpatient psychiatrist: Dr. Obrien at [facility]  Therapy: Has had therapy in the past, states it was helpful  Hospitalizations: [place] x2 in 2018.  One time for 1 night.  One time for 2 weeks.  These were both related to increased agitation and aggression and threats of homicide.  Suicide attempts: 1 in 2018    SOCIAL HISTORY SUMMARY  Lives with mother and brother in an apartment  Developmental history: Normal, was homeschooled  Has close friends in [facility], not many here  Education: Was homeschooled but graduated high school.  Employment: Is currently working at [employer], this is his first job.  Wants to direct movies.  Currently saving money for a film camera.  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   No past surgical history on file.         FAMILY  "PSYCHIATRIC HISTORY  Dad is bipolar (not diagnosed), grandma has borderline personality disorder, cousin with schizophrenia, brother has self-harm/depression/ADHD.  Cousin and aunt have attempted suicide.    FAMILY MEDICAL HISTORY  Dad had heart problems and stroke    PHYSICAL EXAMINATION  Vital signs: There were no vitals taken for this visit.  Musculoskeletal: Gait is normal. No gross abnormalities noted.   Abnormal movements: Were not noted    MENTAL STATUS EXAMINATION    General: Jerome La appears stated age.  And exhibits grooming which is appropriate and casual.  Hygiene is good.  Patient has dark brown shoulder length hair.  Behavior: Pt is mostly calm and cooperative with interview.  Not in acute distress.  Eye contact is poor.  Psychomotor: Patient makes several gesticulations with his hands.  Tics or tremors are not noted.  Speech: Moderate rate, tone.  Loud volume  Language: Fluent English  Mood: \"good\".  Per patient report  Affect: Flexible, Full range and Congruent with content  Thought Process: Logical and Goal-directed  Thought Content: denies suicidal ideation, denies homicidal ideation. Within normal limits  Perception: denies auditory hallucinations, denies visual hallucinations. No delusions noted on interview.    Attention span and concentration: Attentive to interview  Orientation: Alert  Recent and remote memory: No gross evidence of memory deficits  Insight: Good  Judgment: Good         CURRENT RISK ASSESSMENT       Suicide: Low       Homicide: Low       Self-Harm: Low       Relapse: Not applicable       Crisis Safety Plan Reviewed Not Indicated    [place]   [place].  No concerns about misuse of controlled substance.    ASSESSMENT  Jerome La is a 21 y.o. old male presenting for follow up management of bipolar disorder and anxiety.  Patient has been taking all medications without noted side effect. His mood has been stable. Pt was started on current medication regimen in " 2018 after hospitalization. Has been more or less consistent with this regimen since that time. He has physically matured since that time and likely requires increased dosing of medications for proper stability. Educated patient on importance of proper lithium dosing and it's antimanic properties. Lamictal dose is currently 100mg and is likely not necessary at this point. Will plan to taper slowly over the next month. Patient will obtain lithium level prior to next appointment and we will plan to titrate Lithium accordingly. Lithium is likely sub-therapeutic now as it was back on 3/8/22, discussed this with patient and recommended increasing lithium dose today. Patient declined and requested to taper from lamictal before increasing lithium, is concerned about side effects of lithium. I explained the risks and benefits associated with all treatment considerations.    Today, will plan to taper lamictal, will take 50mg for 2 weeks, then 25mg for 2 weeks then stop. Will obtain routine lab monitoring for lithium use.    DIAGNOSES/PLAN  Problem 1: Bipolar 1 disorder  Medications:   Taper Lamictal. 50mg for 2 weeks, then 25mg for 2weeks then stop.  ContinueSeroquel  mg every morning and Seroquel  mg nightly.    Continue Lithium  mg p.m.        Problem 2: Generalized anxiety disorder  Plan as above  Patient stopped taking propranolol, was ineffective for him      Most recent labs done on 3/8/22 and showed Lithium level <0.3, this is lower than typical therapeutic range, and is expected as Lithium was added as an adjunctive therapy for mood stability. All other labs obtained were within normal limits. Pt due for routine labs CBC, CMP, TSH, and Lithium.      Medication options, alternatives (including no medications) and medication risks/benefits/side effects were discussed in detail.  The patient was advised to call, message clinician on [employer], or come in to the clinic if symptoms worsen or if  questions/issues regarding their medications arise.  The patient verbalized understanding and agreement.    The patient was educated to call 911, call the suicide hotline, or go to the local ER if having thoughts of suicide or homicide.  The patient verbalized understanding and agreement.   The proposed treatment plan was discussed with the patient who was provided the opportunity to ask questions and make suggestions regarding alternative treatment. Patient verbalized understanding and expressed agreement with the plan.      Return to clinic in 5 weeks or sooner if symptoms worsen.    This appointment was supervised by attending psychiatrist, Gloria Irving DO, who agrees with assessment and treatment plan.  See attending attestation for more details.       Alan Foley D.O.  9/26/2022

## 2022-10-31 ENCOUNTER — APPOINTMENT (OUTPATIENT)
Dept: BEHAVIORAL HEALTH | Facility: PSYCHIATRIC FACILITY | Age: 22
End: 2022-10-31
Payer: COMMERCIAL

## 2022-11-15 DIAGNOSIS — F31.64 SEVERE MIXED BIPOLAR I DISORDER WITH PSYCHOTIC FEATURES (HCC): ICD-10-CM

## 2022-11-15 RX ORDER — QUETIAPINE 200 MG/1
TABLET, FILM COATED, EXTENDED RELEASE ORAL
Qty: 30 TABLET | Refills: 1 | Status: SHIPPED | OUTPATIENT
Start: 2022-11-15 | End: 2023-01-17

## 2022-11-15 NOTE — TELEPHONE ENCOUNTER
Received request via: Pharmacy    Was the patient seen in the last year in this department? Yes    Does the patient have an active prescription (recently filled or refills available) for medication(s) requested? No    Does the patient have custodial Plus and need 100 day supply (blood pressure, diabetes and cholesterol meds only)? Medication is not for cholesterol, blood pressure or diabetes

## 2022-11-21 ENCOUNTER — OFFICE VISIT (OUTPATIENT)
Dept: BEHAVIORAL HEALTH | Facility: PSYCHIATRIC FACILITY | Age: 22
End: 2022-11-21
Payer: COMMERCIAL

## 2022-11-21 VITALS
WEIGHT: 159.6 LBS | HEIGHT: 74 IN | SYSTOLIC BLOOD PRESSURE: 130 MMHG | BODY MASS INDEX: 20.48 KG/M2 | DIASTOLIC BLOOD PRESSURE: 58 MMHG

## 2022-11-21 DIAGNOSIS — F31.64 SEVERE MIXED BIPOLAR I DISORDER WITH PSYCHOTIC FEATURES (HCC): ICD-10-CM

## 2022-11-21 PROCEDURE — 99214 OFFICE O/P EST MOD 30 MIN: CPT | Mod: GC | Performed by: STUDENT IN AN ORGANIZED HEALTH CARE EDUCATION/TRAINING PROGRAM

## 2022-11-21 RX ORDER — LITHIUM CARBONATE 300 MG/1
TABLET, FILM COATED, EXTENDED RELEASE ORAL
Qty: 70 TABLET | Refills: 0 | Status: SHIPPED | OUTPATIENT
Start: 2022-11-21 | End: 2022-12-19

## 2022-11-21 RX ORDER — LAMOTRIGINE 25 MG/1
25 TABLET ORAL DAILY
Qty: 14 TABLET | Refills: 0 | Status: SHIPPED | OUTPATIENT
Start: 2022-11-21 | End: 2023-02-13

## 2022-11-21 ASSESSMENT — ENCOUNTER SYMPTOMS
ABDOMINAL PAIN: 0
CONSTIPATION: 0
FEVER: 0
SEIZURES: 0
NERVOUS/ANXIOUS: 0
HALLUCINATIONS: 0
PALPITATIONS: 0
WEAKNESS: 0
DIARRHEA: 0
CHILLS: 0
HEADACHES: 0
NAUSEA: 0
DIZZINESS: 0
DEPRESSION: 0
LOSS OF CONSCIOUSNESS: 0
SHORTNESS OF BREATH: 0
MYALGIAS: 0

## 2022-11-21 NOTE — PROGRESS NOTES
[facility]  Medication Management Note    Evaluation completed by: Alan Foley D.O.   Date of Service: 11/21/22  Appointment type: in-office appointment.        CHIEF COMPLAINT/REASON FOR VISIT  Medication management and follow-up    HISTORY OF PRESENT ILLNESS  Jerome La is a 21 y.o. old male who presents today for follow up management of bipolar and anxiety.   Pt was last seen on 9/26/22, at which time the plan was to continue Seroquel XR are 200 mg in the morning and Seroquel  mg at bedtime, with lithium  mg at bedtime for mood, and to taper and stop lamotrigine.    Patient tapered lamictal to 50mg. Didn't taper off because he was getting 13 hrs of sleep but feeling very tired. Is wanting to increase Lithium before stopping Lamictal. Denies other side effects of reducing lamictal. Is taking other medications as prescribed. 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 He enjoys being creative and that is his passion. Episodes of anger are much less severe and he is less angry. He is more aware of his emotions and how he is talking to his mom and brother. He is happy with how things are with his anger and mood. Anxiety has also improved. Denies HI and SI.         PSYCHOSOCIAL CHANGES SINCE LAST VISIT   Is going to apply to work and [employer].    CURRENT MEDICATIONS, ADHERENCE, AND SIDE EFFECTS   Seroquel  mg qAM  Seroquel  mg qHS   Lamictal 50 mg daily  Lithium  mg qHS      PSYCHIATRIC REVIEW OF SYSTEMS  Depression: Denies depression, reports good mood, sleep is good  Angelita: is less irritable and upset; denies grandiosity, increased goal-directed activity, decreased need for sleep. No physical or verbal aggression.  Psychosis: No auditory or visual hallucinations  Anxiety: Patient is less anxious about medical issues, no overt anxiety causing difficulty sleeping or increased muscle tension  PTSD: no trauma      MEDICAL  REVIEW OF SYSTEMS  Review of Systems   Constitutional:  Negative for chills and fever.   HENT:  Negative for tinnitus.    Respiratory:  Negative for shortness of breath.    Cardiovascular:  Negative for chest pain and palpitations.   Gastrointestinal:  Negative for abdominal pain, constipation, diarrhea and nausea.   Musculoskeletal:  Negative for joint pain and myalgias.   Neurological:  Negative for dizziness, seizures, loss of consciousness, weakness and headaches.   Psychiatric/Behavioral:  Negative for depression, hallucinations and suicidal ideas. The patient is not nervous/anxious.        ALLERGIES  No Known Allergies     PAST PSYCHIATRIC HISTORY  Past outpatient psychiatrist: Dr. Obrien at [facility]  Therapy: Has had therapy in the past, states it was helpful  Hospitalizations: [place] x2 in 2018.  One time for 1 night.  One time for 2 weeks.  These were both related to increased agitation and aggression and threats of homicide.  Suicide attempts: 1 in 2018    SOCIAL HISTORY SUMMARY  Lives with mother and brother in an apartment  Developmental history: Normal, was homeschooled  Has close friends in [facility], not many here  Education: Was homeschooled but graduated high school.  Employment: Is currently working at [employer], this is his first job.  Wants to direct movies.  Currently saving money for a film camera.  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   No past surgical history on file.         FAMILY PSYCHIATRIC HISTORY  Dad is bipolar (not diagnosed), grandma has borderline personality disorder, cousin with schizophrenia, brother has self-harm/depression/ADHD.  Cousin and aunt have attempted suicide.    FAMILY MEDICAL HISTORY  Dad had heart problems and stroke    PHYSICAL EXAMINATION  Vital signs: There were no vitals taken  "for this visit.  Musculoskeletal: Gait is normal. No gross abnormalities noted.   Abnormal movements: Were not noted    MENTAL STATUS EXAMINATION    General: Jerome La appears stated age.  And exhibits grooming which is appropriate and casual.  Hygiene is good.  Patient has dark brown shoulder length hair.  Behavior: Pt is mostly calm and cooperative with interview.  Not in acute distress.  Eye contact is poor.  Psychomotor:  Tics or tremors are not noted.  Speech: Moderate rate, tone, and volume  Language: Fluent English  Mood: \"good\".  Per patient report  Affect: Flexible, Full range and Congruent with content  Thought Process: Logical and Circumstantial  Thought Content: denies suicidal ideation, denies homicidal ideation. Within normal limits  Perception: denies auditory hallucinations, denies visual hallucinations. No delusions noted on interview.    Attention span and concentration: Attentive to interview  Orientation: Alert  Recent and remote memory: No gross evidence of memory deficits  Insight: Good  Judgment: Good         CURRENT RISK ASSESSMENT       Suicide: Low       Homicide: Low       Self-Harm: Low       Relapse: Not applicable       Crisis Safety Plan Reviewed Not Indicated    [place].  No concerns about misuse of controlled substance.    ASSESSMENT  Jerome La is a 21 y.o. old male presenting for follow up management of bipolar disorder and anxiety.  Patient reduced lamictal to 50mg, did not stop. Some anxiety with reduction of medication, but patient is eager to increase lithium. His mood has been stable. Pt was started on medication regimen in 2018 after hospitalization. Has been more or less consistent with this regimen since that time. He has physically matured since that time and likely requires increased dosing of medications for proper stability and thus our alterations in medication regimen. Educated patient on importance of proper lithium dosing and it's " antimanic properties. Lamictal dose will be tapered. Patient will obtain lithium level prior to next appointment after Lithium titrated to 600mg and then 900mg, educated on importance of timing of labs with lithium.     Today, will plan to     DIAGNOSES/PLAN  Problem 1: Bipolar 1 disorder  Medications:   Continue taper Lamictal. 25mg for 2weeks then stop.  ContinueSeroquel  mg every morning and Seroquel  mg nightly.    Increase Lithium ER to 600 mg p.m. for 2 weeks then increase to 900mg.  Labs: Lithium level, 1 week after increase to 900 mg, orders are placed.        Problem 2: Generalized anxiety disorder  Plan as above  Patient stopped taking propranolol, was ineffective for him      Most recent labs done on 3/8/22 and showed Lithium level <0.3, this is lower than typical therapeutic range, and is expected as Lithium was added as an adjunctive therapy for mood stability. Pt due for routine labs CBC, CMP, TSH, and Lithium.      Medication options, alternatives (including no medications) and medication risks/benefits/side effects were discussed in detail.  The patient was advised to call, message clinician on [employer], or come in to the clinic if symptoms worsen or if questions/issues regarding their medications arise.  The patient verbalized understanding and agreement.    The patient was educated to call 911, call the suicide hotline, or go to the local ER if having thoughts of suicide or homicide.  The patient verbalized understanding and agreement.   The proposed treatment plan was discussed with the patient who was provided the opportunity to ask questions and make suggestions regarding alternative treatment. Patient verbalized understanding and expressed agreement with the plan.      Return to clinic in 4 weeks or sooner if symptoms worsen.    This appointment was supervised by attending psychiatrist, Gloria Irving DO, who agrees with assessment and treatment plan.  See attending attestation  for more details.       Alan Foley D.O.  11//21/22

## 2022-12-19 ENCOUNTER — APPOINTMENT (OUTPATIENT)
Dept: BEHAVIORAL HEALTH | Facility: PSYCHIATRIC FACILITY | Age: 22
End: 2022-12-19
Payer: COMMERCIAL

## 2023-01-14 DIAGNOSIS — F31.64 SEVERE MIXED BIPOLAR I DISORDER WITH PSYCHOTIC FEATURES (HCC): ICD-10-CM

## 2023-01-16 NOTE — TELEPHONE ENCOUNTER
Received request via: Pharmacy    Was the patient seen in the last year in this department? Yes    Does the patient have an active prescription (recently filled or refills available) for medication(s) requested?  Will run out of med and refills on 1/15/23    Does the patient have [facility] and need 100 day supply (blood pressure, diabetes and cholesterol meds only)? Patient does not have SCP

## 2023-01-17 RX ORDER — QUETIAPINE 200 MG/1
TABLET, FILM COATED, EXTENDED RELEASE ORAL
Qty: 30 TABLET | Refills: 1 | Status: SHIPPED | OUTPATIENT
Start: 2023-01-17 | End: 2023-02-10

## 2023-02-13 ENCOUNTER — OFFICE VISIT (OUTPATIENT)
Dept: BEHAVIORAL HEALTH | Facility: PSYCHIATRIC FACILITY | Age: 23
End: 2023-02-13
Payer: COMMERCIAL

## 2023-02-13 VITALS — BODY MASS INDEX: 20.49 KG/M2 | HEIGHT: 74 IN

## 2023-02-13 DIAGNOSIS — F31.64 SEVERE MIXED BIPOLAR I DISORDER WITH PSYCHOTIC FEATURES (HCC): ICD-10-CM

## 2023-02-13 DIAGNOSIS — Z79.899 LONG TERM CURRENT USE OF ANTIPSYCHOTIC MEDICATION: ICD-10-CM

## 2023-02-13 PROCEDURE — 99214 OFFICE O/P EST MOD 30 MIN: CPT | Mod: GC | Performed by: STUDENT IN AN ORGANIZED HEALTH CARE EDUCATION/TRAINING PROGRAM

## 2023-02-13 RX ORDER — LITHIUM CARBONATE 300 MG/1
TABLET, FILM COATED, EXTENDED RELEASE ORAL
COMMUNITY
Start: 2023-02-07 | End: 2023-03-06

## 2023-02-13 ASSESSMENT — ENCOUNTER SYMPTOMS
SEIZURES: 0
MYALGIAS: 0
WEAKNESS: 0
DEPRESSION: 0
HALLUCINATIONS: 0
DIZZINESS: 0
FEVER: 0
CONSTIPATION: 0
LOSS OF CONSCIOUSNESS: 0
CHILLS: 0
SHORTNESS OF BREATH: 0
PALPITATIONS: 0
NERVOUS/ANXIOUS: 0
ABDOMINAL PAIN: 0
DIARRHEA: 0
NAUSEA: 0
HEADACHES: 0

## 2023-02-13 NOTE — PROGRESS NOTES
"[facility]  Medication Management Note    Evaluation completed by: Alan Foley D.O.   Date of Service: 02/13/23  Appointment type: in-office appointment.        CHIEF COMPLAINT/REASON FOR VISIT  Medication management and follow-up    HISTORY OF PRESENT ILLNESS  Jerome La is a 22 y.o. old male who presents today for follow up management of bipolar and anxiety.   Pt was last seen on 11/21/22, at which time the plan was to continue Seroquel XR are 200 mg in the morning and Seroquel  mg at bedtime, increase lithium ER to 600 mg, and then 900 mg, and stop lamotrigine.    Patient stopped lamotrigine after last visit, no side effects to having stopped medication.  Patient did not increase to 900 mg of lithium.  Patient would like to stay on 600 mg, reports that he is \"very sensitive to medication changes\".  And reports that when he made these medication changes that he has had an increase in \"my OCD thoughts\".  He is not comfortable talking about these thoughts, but would like me to now that he is planning to see a therapist that specializes in OCD.  Patient reports that his mood has been stable and \"good\".  Reports that he is sleeping better, has less outbursts of anger, is not depressed.  Denies grandiosity, elevated mood, increased and goal-directed activity.    Patient denies SI/HI.  Denies tremor, muscle weakness, headache, change in urination, impaired memory, or hyperreflexia      PSYCHOSOCIAL CHANGES SINCE LAST VISIT   His writing a book at home    CURRENT MEDICATIONS, ADHERENCE, AND SIDE EFFECTS   Seroquel  mg qAM  Seroquel  mg qHS   Lithium  mg qHS      PSYCHIATRIC REVIEW OF SYSTEMS  Depression: Denies depression, reports good mood, sleep is good  Angelita: is less irritable and upset; denies grandiosity, increased goal-directed activity, decreased need for sleep. No physical or verbal aggression.  Psychosis: No auditory or visual hallucinations  Anxiety: " Patient is less anxious about medical issues, no overt anxiety causing difficulty sleeping or increased muscle tension  PTSD: no trauma      MEDICAL REVIEW OF SYSTEMS  Review of Systems   Constitutional:  Negative for chills and fever.   HENT:  Negative for tinnitus.    Respiratory:  Negative for shortness of breath.    Cardiovascular:  Negative for chest pain and palpitations.   Gastrointestinal:  Negative for abdominal pain, constipation, diarrhea and nausea.   Musculoskeletal:  Negative for joint pain and myalgias.   Neurological:  Negative for dizziness, seizures, loss of consciousness, weakness and headaches.   Psychiatric/Behavioral:  Negative for depression, hallucinations and suicidal ideas. The patient is not nervous/anxious.        ALLERGIES  No Known Allergies     PAST PSYCHIATRIC HISTORY  Past outpatient psychiatrist: Dr. Obrien at [facility]  Therapy: Has had therapy in the past, states it was helpful  Hospitalizations: [facility] x[facility] in 2018.  One time for 1 night.  One time for 2 weeks.  These were both related to increased agitation and aggression and threats of homicide.  Suicide attempts: 1 in 2018    SOCIAL HISTORY SUMMARY  Lives with mother and brother in an apartment  Developmental history: Normal, was homeschooled  Has close friends in [place], not many here  Education: Was homeschooled but graduated high school.  Employment: Is currently working at [employer], this is his first job.  Wants to direct movies.  Currently saving money for a film camera.  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   No past surgical history on file.         FAMILY PSYCHIATRIC HISTORY  Dad is bipolar (not diagnosed), grandma has borderline personality disorder, cousin with schizophrenia, brother has self-harm/depression/ADHD.  Cousin and aunt  "have attempted suicide.    FAMILY MEDICAL HISTORY  Dad had heart problems and stroke    PHYSICAL EXAMINATION  Vital signs: Ht 1.88 m (6' 2\")   BMI 20.49 kg/m²   Musculoskeletal: Gait is normal. No gross abnormalities noted.   Abnormal movements: Were not noted    MENTAL STATUS EXAMINATION    General: Jerome La appears stated age.  And exhibits grooming which is appropriate and casual.  Hygiene is good.  Patient has dark brown shoulder length hair.  Behavior: Pt is mostly calm and cooperative with interview.  Not in acute distress.  Eye contact is poor.  Psychomotor:  Tics or tremors are not noted.  Speech: Moderate rate, tone, and volume  Language: Fluent English  Mood: \"good\".  Per patient report  Affect: Flexible, Full range and Congruent with content  Thought Process: Logical and Circumstantial  Thought Content: denies suicidal ideation, denies homicidal ideation. Within normal limits  Perception: denies auditory hallucinations, denies visual hallucinations. No delusions noted on interview.    Attention span and concentration: Attentive to interview  Orientation: Alert  Recent and remote memory: No gross evidence of memory deficits  Insight: Good  Judgment: Good         CURRENT RISK ASSESSMENT       Suicide: Low       Homicide: Low       Self-Harm: Low       Relapse: Not applicable       Crisis Safety Plan Reviewed Not Indicated    [place]  records   reviewed.  No concerns about misuse of controlled substance.    ASSESSMENT  Jerome La is a 22 y.o. old male presenting for follow up management of bipolar disorder and anxiety.  Patient stopped lamotrigine after last visit.  No side effects from cessation of medication.  Increase lithium to 600 mg, and held.  Patient's mood has been stable, improved management of anger.  Did not obtain labs, thus do not know current serum lithium level, however patient is not displaying toxicity: no muscle tremor, muscle weakness, no delirium, no change in urination, no " hyperreflexia.  We will continue at current dose of lithium, urged patient to obtain lithium level, and additional labs as patient has been long-term use of antipsychotic medication without baseline labs obtained.  Overall, mood has been stable, no incidents of zafar.    DIAGNOSES/PLAN  Problem 1: Bipolar 1 disorder  Medications:   ContinueSeroquel  mg every morning and Seroquel  mg nightly.    Continue lithium ER to 600 mg p.m. (patient did not increase to 900 mg)  Labs: Patient needs to obtain baseline labs, CBC, CMP, TSH, A1c, lipid profile, and lithium level        Problem 2: Generalized anxiety disorder  Plan as above  Patient stopped taking propranolol, was ineffective for him      Most recent labs done on 3/8/22 and showed Lithium level <0.3, this is lower than typical therapeutic range, and is expected as Lithium was added as an adjunctive therapy for mood stability. Pt due for routine labs CBC, CMP, TSH, and Lithium.      Medication options, alternatives (including no medications) and medication risks/benefits/side effects were discussed in detail.  The patient was advised to call, message clinician on [employer], or come in to the clinic if symptoms worsen or if questions/issues regarding their medications arise.  The patient verbalized understanding and agreement.    The patient was educated to call 911, call the suicide hotline, or go to the local ER if having thoughts of suicide or homicide.  The patient verbalized understanding and agreement.   The proposed treatment plan was discussed with the patient who was provided the opportunity to ask questions and make suggestions regarding alternative treatment. Patient verbalized understanding and expressed agreement with the plan.      Return to clinic in 6 weeks or sooner if symptoms worsen.    This appointment was supervised by attending psychiatrist, Gloria Irving DO, who agrees with assessment and treatment plan.  See attending attestation  for more details.       Alan Foley D.O.  02/13/23

## 2023-02-22 DIAGNOSIS — F31.64 SEVERE MIXED BIPOLAR I DISORDER WITH PSYCHOTIC FEATURES (HCC): ICD-10-CM

## 2023-02-22 RX ORDER — QUETIAPINE FUMARATE 200 MG/1
200 TABLET, FILM COATED ORAL EVERY EVENING
Qty: 90 TABLET | Refills: 1 | Status: SHIPPED | OUTPATIENT
Start: 2023-02-22 | End: 2023-08-02 | Stop reason: SDUPTHER

## 2023-04-05 ENCOUNTER — APPOINTMENT (OUTPATIENT)
Dept: RADIOLOGY | Facility: MEDICAL CENTER | Age: 23
End: 2023-04-05
Attending: EMERGENCY MEDICINE
Payer: COMMERCIAL

## 2023-04-05 ENCOUNTER — HOSPITAL ENCOUNTER (EMERGENCY)
Facility: MEDICAL CENTER | Age: 23
End: 2023-04-05
Attending: EMERGENCY MEDICINE
Payer: COMMERCIAL

## 2023-04-05 VITALS
DIASTOLIC BLOOD PRESSURE: 82 MMHG | BODY MASS INDEX: 20.29 KG/M2 | HEIGHT: 74 IN | RESPIRATION RATE: 20 BRPM | SYSTOLIC BLOOD PRESSURE: 121 MMHG | TEMPERATURE: 99.1 F | HEART RATE: 86 BPM | OXYGEN SATURATION: 99 % | WEIGHT: 158.07 LBS

## 2023-04-05 DIAGNOSIS — R10.9 ABDOMINAL PAIN, UNSPECIFIED ABDOMINAL LOCATION: ICD-10-CM

## 2023-04-05 LAB
ALBUMIN SERPL BCP-MCNC: 4.4 G/DL (ref 3.2–4.9)
ALBUMIN/GLOB SERPL: 1.5 G/DL
ALP SERPL-CCNC: 64 U/L (ref 30–99)
ALT SERPL-CCNC: 17 U/L (ref 2–50)
ANION GAP SERPL CALC-SCNC: 10 MMOL/L (ref 7–16)
APPEARANCE UR: CLEAR
AST SERPL-CCNC: 16 U/L (ref 12–45)
BASOPHILS # BLD AUTO: 0.3 % (ref 0–1.8)
BASOPHILS # BLD: 0.03 K/UL (ref 0–0.12)
BILIRUB SERPL-MCNC: 0.3 MG/DL (ref 0.1–1.5)
BILIRUB UR QL STRIP.AUTO: NEGATIVE
BUN SERPL-MCNC: 10 MG/DL (ref 8–22)
CALCIUM ALBUM COR SERPL-MCNC: 9.3 MG/DL (ref 8.5–10.5)
CALCIUM SERPL-MCNC: 9.6 MG/DL (ref 8.4–10.2)
CHLORIDE SERPL-SCNC: 107 MMOL/L (ref 96–112)
CO2 SERPL-SCNC: 24 MMOL/L (ref 20–33)
COLOR UR: YELLOW
CREAT SERPL-MCNC: 0.87 MG/DL (ref 0.5–1.4)
EOSINOPHIL # BLD AUTO: 0.69 K/UL (ref 0–0.51)
EOSINOPHIL NFR BLD: 6.8 % (ref 0–6.9)
ERYTHROCYTE [DISTWIDTH] IN BLOOD BY AUTOMATED COUNT: 37.9 FL (ref 35.9–50)
GFR SERPLBLD CREATININE-BSD FMLA CKD-EPI: 126 ML/MIN/1.73 M 2
GLOBULIN SER CALC-MCNC: 2.9 G/DL (ref 1.9–3.5)
GLUCOSE SERPL-MCNC: 105 MG/DL (ref 65–99)
GLUCOSE UR STRIP.AUTO-MCNC: NEGATIVE MG/DL
HCT VFR BLD AUTO: 44.8 % (ref 42–52)
HGB BLD-MCNC: 15.2 G/DL (ref 14–18)
IMM GRANULOCYTES # BLD AUTO: 0.02 K/UL (ref 0–0.11)
IMM GRANULOCYTES NFR BLD AUTO: 0.2 % (ref 0–0.9)
KETONES UR STRIP.AUTO-MCNC: NEGATIVE MG/DL
LEUKOCYTE ESTERASE UR QL STRIP.AUTO: NEGATIVE
LYMPHOCYTES # BLD AUTO: 2.69 K/UL (ref 1–4.8)
LYMPHOCYTES NFR BLD: 26.5 % (ref 22–41)
MCH RBC QN AUTO: 28.8 PG (ref 27–33)
MCHC RBC AUTO-ENTMCNC: 33.9 G/DL (ref 33.7–35.3)
MCV RBC AUTO: 85 FL (ref 81.4–97.8)
MICRO URNS: NORMAL
MONOCYTES # BLD AUTO: 0.62 K/UL (ref 0–0.85)
MONOCYTES NFR BLD AUTO: 6.1 % (ref 0–13.4)
NEUTROPHILS # BLD AUTO: 6.12 K/UL (ref 1.82–7.42)
NEUTROPHILS NFR BLD: 60.1 % (ref 44–72)
NITRITE UR QL STRIP.AUTO: NEGATIVE
NRBC # BLD AUTO: 0 K/UL
NRBC BLD-RTO: 0 /100 WBC
PH UR STRIP.AUTO: 7 [PH] (ref 5–8)
PLATELET # BLD AUTO: 239 K/UL (ref 164–446)
PMV BLD AUTO: 9.6 FL (ref 9–12.9)
POTASSIUM SERPL-SCNC: 4.1 MMOL/L (ref 3.6–5.5)
PROT SERPL-MCNC: 7.3 G/DL (ref 6–8.2)
PROT UR QL STRIP: NEGATIVE MG/DL
RBC # BLD AUTO: 5.27 M/UL (ref 4.7–6.1)
RBC UR QL AUTO: NEGATIVE
SODIUM SERPL-SCNC: 141 MMOL/L (ref 135–145)
SP GR UR STRIP.AUTO: 1.01
WBC # BLD AUTO: 10.2 K/UL (ref 4.8–10.8)

## 2023-04-05 PROCEDURE — 81003 URINALYSIS AUTO W/O SCOPE: CPT

## 2023-04-05 PROCEDURE — 74176 CT ABD & PELVIS W/O CONTRAST: CPT

## 2023-04-05 PROCEDURE — 80053 COMPREHEN METABOLIC PANEL: CPT

## 2023-04-05 PROCEDURE — 85025 COMPLETE CBC W/AUTO DIFF WBC: CPT

## 2023-04-05 PROCEDURE — 36415 COLL VENOUS BLD VENIPUNCTURE: CPT

## 2023-04-05 PROCEDURE — 99284 EMERGENCY DEPT VISIT MOD MDM: CPT

## 2023-04-05 NOTE — ED PROVIDER NOTES
"ED Provider Note    CHIEF COMPLAINT  Chief Complaint   Patient presents with    Abdominal Pain     This morning pt woke up with constant pain in his suprapubic region. When turning to the side pain radiates to left flank. Pain is constant and worse with movement. No dysuria or blood urine. No frequency.        EXTERNAL RECORDS REVIEWED  Outpatient Notes most recent progress note from psychiatry reviewed.  Patient seen on 2/13/2023.      HPI/ROS  LIMITATION TO HISTORY   Select: : None  OUTSIDE HISTORIAN(S):  Parent Mom is at the bedside    Jerome La is a 22 y.o. male with history of bipolar disorder, generalized anxiety disorder, and OCD who presents abdominal pain.    Patient states the abdominal pain he came in for is \"gone now.\"  He states when he moves at this point he has 3 out of 10 pain but is otherwise pain-free.  Patient states he awoke with pain this morning.  It was constant but coming in waves, 7 out of 10 at its maximum.  When he would lay on his right side it was worse.  If he sat up and leaned forward it would be better.  He has never had pain like this before it was located in the suprapubic area and at times radiated into the left lower quadrant.  He did experience some pain through his penis.      He denies trauma, history of kidney stones, flank pain, nausea, vomiting, fever, chills, hematuria.    PAST MEDICAL HISTORY     General anxiety disorder  OCD  Bipolar disorder    SURGICAL HISTORY  patient denies any surgical history    FAMILY HISTORY  No family history of kidney stones    SOCIAL HISTORY  Social History     Tobacco Use    Smoking status: Never    Smokeless tobacco: Never   Substance and Sexual Activity    Alcohol use: No    Drug use: No    Sexual activity: Not on file       CURRENT MEDICATIONS  Seroquel  Lithium    ALLERGIES  No Known Allergies    PHYSICAL EXAM  VITAL SIGNS: /82   Pulse 86   Temp 37.3 °C (99.1 °F)   Resp 20   Ht 1.88 m (6' 2\")   Wt 71.7 kg (158 lb 1.1 oz)  "  SpO2 99%   BMI 20.29 kg/m²    General:  WDWN/thin, nontoxic appearing in NAD; A+Ox3; V/S as above; tachycardic, slightly anxious, afebrile  Skin: warm and dry; slightly pale color; no rash  HEENT: NCAT; EOMs intact; PERRL; no scleral icterus   Neck: FROM  Cardiovascular: Regular heart rate and rhythm.  No murmurs, rubs, or gallops; pulses 2+ bilaterally radially  Lungs: No respiratory distress or tachypnea; Clear to auscultation with good air movement bilaterally.  No wheezes, rhonchi, or rales.   Abdomen: BS present; soft; NTND; no rebound, guarding, or rigidity.  No organomegaly or pulsatile mass; no CVAT   Extremities: NAILS x 4; no e/o trauma; no pedal edema; neg Paradise's  Neurologic: CNs III-XII grossly intact; speech clear  Psychiatric: Appropriate affect, normal mood      DIAGNOSTIC STUDIES / PROCEDURES  LABS  Results for orders placed or performed during the hospital encounter of 04/05/23   URINALYSIS    Specimen: Urine   Result Value Ref Range    Color Yellow     Character Clear     Specific Gravity 1.010 <1.035    Ph 7.0 5.0 - 8.0    Glucose Negative Negative mg/dL    Ketones Negative Negative mg/dL    Protein Negative Negative mg/dL    Bilirubin Negative Negative    Nitrite Negative Negative    Leukocyte Esterase Negative Negative    Occult Blood Negative Negative    Micro Urine Req see below    CBC WITH DIFFERENTIAL   Result Value Ref Range    WBC 10.2 4.8 - 10.8 K/uL    RBC 5.27 4.70 - 6.10 M/uL    Hemoglobin 15.2 14.0 - 18.0 g/dL    Hematocrit 44.8 42.0 - 52.0 %    MCV 85.0 81.4 - 97.8 fL    MCH 28.8 27.0 - 33.0 pg    MCHC 33.9 33.7 - 35.3 g/dL    RDW 37.9 35.9 - 50.0 fL    Platelet Count 239 164 - 446 K/uL    MPV 9.6 9.0 - 12.9 fL    Neutrophils-Polys 60.10 44.00 - 72.00 %    Lymphocytes 26.50 22.00 - 41.00 %    Monocytes 6.10 0.00 - 13.40 %    Eosinophils 6.80 0.00 - 6.90 %    Basophils 0.30 0.00 - 1.80 %    Immature Granulocytes 0.20 0.00 - 0.90 %    Nucleated RBC 0.00 /100 WBC    Neutrophils  (Absolute) 6.12 1.82 - 7.42 K/uL    Lymphs (Absolute) 2.69 1.00 - 4.80 K/uL    Monos (Absolute) 0.62 0.00 - 0.85 K/uL    Eos (Absolute) 0.69 (H) 0.00 - 0.51 K/uL    Baso (Absolute) 0.03 0.00 - 0.12 K/uL    Immature Granulocytes (abs) 0.02 0.00 - 0.11 K/uL    NRBC (Absolute) 0.00 K/uL   CMP   Result Value Ref Range    Sodium 141 135 - 145 mmol/L    Potassium 4.1 3.6 - 5.5 mmol/L    Chloride 107 96 - 112 mmol/L    Co2 24 20 - 33 mmol/L    Anion Gap 10.0 7.0 - 16.0    Glucose 105 (H) 65 - 99 mg/dL    Bun 10 8 - 22 mg/dL    Creatinine 0.87 0.50 - 1.40 mg/dL    Calcium 9.6 8.4 - 10.2 mg/dL    AST(SGOT) 16 12 - 45 U/L    ALT(SGPT) 17 2 - 50 U/L    Alkaline Phosphatase 64 30 - 99 U/L    Total Bilirubin 0.3 0.1 - 1.5 mg/dL    Albumin 4.4 3.2 - 4.9 g/dL    Total Protein 7.3 6.0 - 8.2 g/dL    Globulin 2.9 1.9 - 3.5 g/dL    A-G Ratio 1.5 g/dL   CORRECTED CALCIUM   Result Value Ref Range    Correct Calcium 9.3 8.5 - 10.5 mg/dL   ESTIMATED GFR   Result Value Ref Range    GFR (CKD-EPI) 126 >60 mL/min/1.73 m 2         RADIOLOGY  Radiologist interpretation:   CT-RENAL COLIC EVALUATION(A/P W/O)   Final Result      1.  No renal stone or hydronephrosis.   2.  Normal appendix.   3.  Increased colonic stool suggesting constipation.            COURSE & MEDICAL DECISION MAKING    ED Observation Status? Yes; I am placing the patient in to an observation status due to a diagnostic uncertainty as well as therapeutic intensity. Patient placed in observation status at 11:35 AM, 4/5/2023.     Observation plan is as follows: CT imaging, labs, reevaluation for pain    Upon Reevaluation, the patient's condition has: Improved; and will be discharged.    Patient discharged from ED Observation status at 14:00 (Time) 4/5/23 (Date).     INITIAL ASSESSMENT, COURSE AND PLAN  Care Narrative: This is a 22-year-old who presents for suprapubic abdominal pain with minimal radiation to the left lower quadrant.  When I evaluated the patient his pain had  essentially resolved spontaneously.  I suspect a kidney stone.  For this reason we obtained a CT renal colic protocol to evaluate for more stones as well as hydroureter/hydronephrosis.  This demonstrated increased colonic stool and a normal appendix.  No kidney stone or hydronephrosis/hydroureter were detected.  UA was negative for infectious signs as well as blood.  Labs demonstrated a glucose of 105.  Normal white blood cell count.    Upon reevaluation, lab and imaging results relayed to the patient and his mother.  Patient states he has no pain now unless he lifts his leg up towards his abdomen while standing.  He suspects he was having muscular pain.  We discussed abdominal pain return precautions.    ADDITIONAL PROBLEM LIST  Generalized anxiety disorder, OCD, bipolar disorder GERD, PTSD    DISPOSITION AND DISCUSSIONS      FINAL DIAGNOSIS  1. Abdominal pain, unspecified abdominal location           Electronically signed by: Doreen Orona M.D., 4/5/2023 10:56 AM

## 2023-04-10 RX ORDER — LITHIUM CARBONATE 300 MG/1
TABLET, FILM COATED, EXTENDED RELEASE ORAL
Qty: 90 TABLET | Refills: 0 | Status: SHIPPED | OUTPATIENT
Start: 2023-04-10 | End: 2023-05-16

## 2023-04-10 NOTE — TELEPHONE ENCOUNTER
Received request via: Pharmacy    Was the patient seen in the last year in this department? Yes    Does the patient have an active prescription (recently filled or refills available) for medication(s) requested?      Does the patient have nursing home Plus and need 100 day supply (blood pressure, diabetes and cholesterol meds only)? Patient does not have SCP

## 2023-04-17 ENCOUNTER — TELEPHONE (OUTPATIENT)
Dept: HEALTH INFORMATION MANAGEMENT | Facility: OTHER | Age: 23
End: 2023-04-17

## 2023-04-25 NOTE — ED NOTES
Pt resting in bed with mom at bedside - no needs identified at this time  Will continue to assess   No

## 2023-05-16 RX ORDER — LITHIUM CARBONATE 300 MG/1
TABLET, FILM COATED, EXTENDED RELEASE ORAL
Qty: 90 TABLET | Refills: 0 | Status: SHIPPED | OUTPATIENT
Start: 2023-05-16 | End: 2023-06-19

## 2023-05-16 NOTE — TELEPHONE ENCOUNTER
Received request via: Pharmacy    Was the patient seen in the last year in this department? Yes, 2/13/23    Does the patient have an active prescription (recently filled or refills available) for medication(s) requested?  No, 0 Refills    Does the patient have longterm Plus and need 100 day supply (blood pressure, diabetes and cholesterol meds only)? Medication is not for cholesterol, blood pressure or diabetes and Patient does not have SCP

## 2023-05-17 DIAGNOSIS — F31.64 SEVERE MIXED BIPOLAR I DISORDER WITH PSYCHOTIC FEATURES (HCC): ICD-10-CM

## 2023-05-17 RX ORDER — QUETIAPINE 200 MG/1
200 TABLET, FILM COATED, EXTENDED RELEASE ORAL
Qty: 90 TABLET | Refills: 0 | Status: SHIPPED | OUTPATIENT
Start: 2023-05-17 | End: 2023-08-02 | Stop reason: SDUPTHER

## 2023-05-17 NOTE — TELEPHONE ENCOUNTER
Received request via: Pharmacy    Was the patient seen in the last year in this department? Yes, lov = 2/13/23    Does the patient have an active prescription (recently filled or refills available) for medication(s) requested?  Will run out of med on 5/22/23, 1 refill    Does the patient have shelter Plus and need 100 day supply (blood pressure, diabetes and cholesterol meds only)? Patient does not have SCP

## 2023-06-19 RX ORDER — LITHIUM CARBONATE 300 MG/1
TABLET, FILM COATED, EXTENDED RELEASE ORAL
Qty: 90 TABLET | Refills: 0 | Status: SHIPPED | OUTPATIENT
Start: 2023-06-19 | End: 2023-08-01

## 2023-06-19 NOTE — TELEPHONE ENCOUNTER
Patient had appointment scheduled with me for 6/19/23. Patient was a no-show. Will provide 1 month supply of medication but patient will need to be scheduled with new resident as soon as possible, for continued prescription of these medications.   Additionally, patient should obtain ordered blood work before his next appointment.

## 2023-06-19 NOTE — TELEPHONE ENCOUNTER
Received request via: Pharmacy    Was the patient seen in the last year in this department? Yes, lov = 2/13/23    Does the patient have an active prescription (recently filled or refills available) for medication(s) requested?  No, 0 Refills    Does the patient have retirement Plus and need 100 day supply (blood pressure, diabetes and cholesterol meds only)? Patient does not have SCP

## 2023-06-22 ENCOUNTER — HOSPITAL ENCOUNTER (EMERGENCY)
Facility: MEDICAL CENTER | Age: 23
End: 2023-06-22
Attending: EMERGENCY MEDICINE
Payer: COMMERCIAL

## 2023-06-22 ENCOUNTER — APPOINTMENT (OUTPATIENT)
Dept: RADIOLOGY | Facility: MEDICAL CENTER | Age: 23
End: 2023-06-22
Attending: EMERGENCY MEDICINE
Payer: COMMERCIAL

## 2023-06-22 VITALS
SYSTOLIC BLOOD PRESSURE: 135 MMHG | BODY MASS INDEX: 21.68 KG/M2 | TEMPERATURE: 98.5 F | HEART RATE: 98 BPM | DIASTOLIC BLOOD PRESSURE: 90 MMHG | OXYGEN SATURATION: 97 % | RESPIRATION RATE: 18 BRPM | HEIGHT: 74 IN | WEIGHT: 168.9 LBS

## 2023-06-22 DIAGNOSIS — S62.339A CLOSED BOXER'S FRACTURE, INITIAL ENCOUNTER: ICD-10-CM

## 2023-06-22 PROCEDURE — 29125 APPL SHORT ARM SPLINT STATIC: CPT

## 2023-06-22 PROCEDURE — A9270 NON-COVERED ITEM OR SERVICE: HCPCS | Performed by: EMERGENCY MEDICINE

## 2023-06-22 PROCEDURE — 99284 EMERGENCY DEPT VISIT MOD MDM: CPT

## 2023-06-22 PROCEDURE — 302874 HCHG BANDAGE ACE 2 OR 3""

## 2023-06-22 PROCEDURE — 73130 X-RAY EXAM OF HAND: CPT | Mod: RT

## 2023-06-22 PROCEDURE — 700102 HCHG RX REV CODE 250 W/ 637 OVERRIDE(OP): Performed by: EMERGENCY MEDICINE

## 2023-06-22 RX ORDER — IBUPROFEN 600 MG/1
600 TABLET ORAL ONCE
Status: COMPLETED | OUTPATIENT
Start: 2023-06-22 | End: 2023-06-22

## 2023-06-22 RX ADMIN — IBUPROFEN 600 MG: 600 TABLET, FILM COATED ORAL at 19:41

## 2023-06-22 ASSESSMENT — FIBROSIS 4 INDEX: FIB4 SCORE: 0.34

## 2023-06-23 NOTE — ED TRIAGE NOTES
Pt bib family with c/o right hand pain. He reports that he punched a concrete wall and since then has been experiencing increasing pain.

## 2023-06-23 NOTE — ED NOTES
ERP at bedside. Pt agrees with plan of care discussed by ERP. Caroline in low position, side rail up for pt safety. Call light within reach. Continued to monitor

## 2023-06-23 NOTE — ED NOTES
Discharge instructions provided. Pt verbalized understanding of discharge instructions to follow up with orthopedics and to return to ER if condition worsens. Pt ambulated out of ER without difficulty.

## 2023-06-23 NOTE — ED PROVIDER NOTES
ED Provider Note    CHIEF COMPLAINT  No chief complaint on file.      EXTERNAL RECORDS REVIEWED    None available none  HPI/ROS  LIMITATION TO HISTORY   None  OUTSIDE HISTORIAN(S):  Mother provided additional history    Jerome La is a 22 y.o. male who presents for evaluation of acute injury and pain to the right hand.  Patient reports his history of bipolar disorder.  He has been more agitated lately but specifically denies suicidality auditory visual hallucinations.  He punched the floor and did not realize that the subfloor is concrete.  This was a few hours ago.  He reports pain to the dorsal aspect of the right hand overlying the fourth and fifth metacarpal heads.  He denies any pain or injury to the wrist forearm elbow or shoulder on the affected side.  No numbness weakness or tingling.  No other complaints    PAST MEDICAL HISTORY   has a past medical history of Bipolar 1 disorder (HCC).    SURGICAL HISTORY  patient denies any surgical history  No major surgeries  FAMILY HISTORY  History reviewed. No pertinent family history.  Noncontributory  SOCIAL HISTORY  Social History     Tobacco Use    Smoking status: Never    Smokeless tobacco: Never   Substance and Sexual Activity    Alcohol use: No    Drug use: No    Sexual activity: Not on file       CURRENT MEDICATIONS    Current Facility-Administered Medications:     ibuprofen (MOTRIN) tablet 600 mg, 600 mg, Oral, Once, Julio Thomas M.D.    Current Outpatient Medications:     lithium carbonate  MG Tab CR tablet, TAKE 3 TABLETS BY MOUTH AT BEDTIME, Disp: 90 Tablet, Rfl: 0    quetiapine (SEROQUEL XR) 200 MG XR tablet, TAKE 1 TABLET BY MOUTH EVERY DAY, Disp: 90 Tablet, Rfl: 0    QUEtiapine (SEROQUEL) 200 MG Tab, Take 1 Tablet by mouth every evening., Disp: 90 Tablet, Rfl: 1    propranolol (INDERAL) 10 MG Tab, TAKE 1 TABLET BY MOUTH AS NEEDED 1 HOUR BEFORE EVENT AS DIRECTED, Disp: 30 Tablet, Rfl: 1        ALLERGIES  No Known Allergies    PHYSICAL  "EXAM  VITAL SIGNS: BP (!) 137/90   Pulse (!) 117   Temp 36.7 °C (98 °F) (Temporal)   Resp 16   Ht 1.88 m (6' 2\")   Wt 76.6 kg (168 lb 14.4 oz)   SpO2 98%   BMI 21.69 kg/m²    Pulse ox interpretation: I interpret this pulse ox as normal.  Constitutional: Alert and oriented x 3, no acute distress  HEENT: Atraumatic normocephalic, pupils are equal round reactive to light extraocular movements are intact. The nares is clear, external ears are normal, mouth shows moist mucous membranes normal dentition for age  Neck: Supple, no JVD no tracheal deviation  Cardiovascular: Regular rate and rhythm no murmur rub or gallop 2+ pulses peripherally x4  Thorax & Lungs: No respiratory distress, no wheezes rales or rhonchi, No chest tenderness.   Skin: Warm dry no acute rash or lesion  Musculoskeletal: Right hand exam is notable for bony tenderness overlying the metacarpal heads of the fourth and fifth.  There is no rotational deformity no laceration no gross deformity.  Neurovascular exam is normal.  No bony tenderness noted over the wrist forearm elbow or clavicle or acromioclavicular joint.    Neurologic: Cranial nerves III through XII are grossly intact no sensory deficit no cerebellar dysfunction   Psychiatric: Anxious no evidence of acute suicidality or homicidality        DIAGNOSTIC STUDIES / PROCEDURES    LABS  None    RADIOLOGY  I have independently interpreted the diagnostic imaging associated with this visit and am waiting the final reading from the radiologist.   My preliminary interpretation is as follows: Very subtle nondisplaced oblique right fourth metacarpal fracture  Radiologist interpretation:   DX-HAND 3+ RIGHT   Final Result      1.  There is a nondisplaced oblique right 4th metacarpal head/neck fracture with no intra-articular involvement.          COURSE & MEDICAL DECISION MAKING    ED Observation Status? No; Patient does not meet criteria for ED Observation.     INITIAL ASSESSMENT, COURSE AND " PLAN  Care Narrative:   This is a very pleasant 22-year-old gentleman who presents here with acute pain to the right hand.  I was concerned primarily about a boxers type fracture.  This was confirmed albeit very subtle on radiograph.  The patient will be placed in an ulnar gutter splint.  He was given ibuprofen.  He will be referred to orthopedics for definitive fracture care and casting.  He does not have any evidence of psychosis or zafar or suicidality to suggest need for psychiatric intervention at this time      ADDITIONAL PROBLEM LIST    DISPOSITION AND DISCUSSIONS  I have discussed management of the patient with the following physicians and CODY's: None    Discussion of management with other QHP or appropriate source(s): None    Escalation of care considered, and ultimately not performed: None    Barriers to care at this time, including but not limited to: None    Decision tools and prescription drugs considered including, but not limited to: None    FINAL DIAGNOSIS  Right nondisplaced fourth metacarpal neck fracture       Electronically signed by: Julio Thomas M.D., 6/22/2023 7:06 PM

## 2023-06-29 ENCOUNTER — APPOINTMENT (OUTPATIENT)
Dept: LAB | Facility: MEDICAL CENTER | Age: 23
End: 2023-06-29
Payer: COMMERCIAL

## 2023-07-25 ENCOUNTER — OFFICE VISIT (OUTPATIENT)
Dept: BEHAVIORAL HEALTH | Facility: PSYCHIATRIC FACILITY | Age: 23
End: 2023-07-25
Payer: COMMERCIAL

## 2023-07-25 VITALS
OXYGEN SATURATION: 98 % | HEIGHT: 74 IN | DIASTOLIC BLOOD PRESSURE: 88 MMHG | SYSTOLIC BLOOD PRESSURE: 125 MMHG | WEIGHT: 163 LBS | BODY MASS INDEX: 20.92 KG/M2 | HEART RATE: 110 BPM

## 2023-07-25 DIAGNOSIS — F41.1 GENERALIZED ANXIETY DISORDER: ICD-10-CM

## 2023-07-25 DIAGNOSIS — Z79.899 LONG TERM CURRENT USE OF ANTIPSYCHOTIC MEDICATION: ICD-10-CM

## 2023-07-25 DIAGNOSIS — F31.64 SEVERE MIXED BIPOLAR I DISORDER WITH PSYCHOTIC FEATURES (HCC): ICD-10-CM

## 2023-07-25 PROCEDURE — 99214 OFFICE O/P EST MOD 30 MIN: CPT | Mod: GC

## 2023-07-25 PROCEDURE — 3074F SYST BP LT 130 MM HG: CPT

## 2023-07-25 PROCEDURE — 3079F DIAST BP 80-89 MM HG: CPT

## 2023-07-25 ASSESSMENT — ENCOUNTER SYMPTOMS
HEARTBURN: 1
MYALGIAS: 0
FEVER: 0
TINGLING: 0
SHORTNESS OF BREATH: 0
DOUBLE VISION: 0
NAUSEA: 0
BLURRED VISION: 0
COUGH: 0
CONSTIPATION: 0
FOCAL WEAKNESS: 0
CHILLS: 0
SENSORY CHANGE: 0
DIARRHEA: 0
DEPRESSION: 0

## 2023-07-25 ASSESSMENT — FIBROSIS 4 INDEX: FIB4 SCORE: 0.34
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
## 2023-07-26 NOTE — ASSESSMENT & PLAN NOTE
Scored 17 on DAVID-7  Medications:  -Continue Seroquel  QAM +  QPM  -Recently self-discontinued Lithium ER 300mg due to unwanted side-effects  -Continue Propranolol 10mg PRN  Other:  -Establish with PCP  -Recommend psychotherapy

## 2023-07-31 DIAGNOSIS — F31.64 SEVERE MIXED BIPOLAR I DISORDER WITH PSYCHOTIC FEATURES (HCC): ICD-10-CM

## 2023-08-02 RX ORDER — QUETIAPINE 200 MG/1
200 TABLET, FILM COATED, EXTENDED RELEASE ORAL
Qty: 90 TABLET | Refills: 0 | OUTPATIENT
Start: 2023-08-02

## 2023-08-02 RX ORDER — QUETIAPINE 200 MG/1
200 TABLET, FILM COATED, EXTENDED RELEASE ORAL
Qty: 90 TABLET | Refills: 0 | Status: SHIPPED | OUTPATIENT
Start: 2023-08-02 | End: 2023-09-25

## 2023-08-02 RX ORDER — QUETIAPINE FUMARATE 200 MG/1
200 TABLET, FILM COATED ORAL EVERY EVENING
Qty: 90 TABLET | Refills: 1 | Status: SHIPPED | OUTPATIENT
Start: 2023-08-02 | End: 2023-09-20

## 2023-08-22 ENCOUNTER — APPOINTMENT (OUTPATIENT)
Dept: BEHAVIORAL HEALTH | Facility: PSYCHIATRIC FACILITY | Age: 23
End: 2023-08-22
Payer: COMMERCIAL

## 2023-09-19 ENCOUNTER — OFFICE VISIT (OUTPATIENT)
Dept: BEHAVIORAL HEALTH | Facility: PSYCHIATRIC FACILITY | Age: 23
End: 2023-09-19
Payer: COMMERCIAL

## 2023-09-19 VITALS
DIASTOLIC BLOOD PRESSURE: 86 MMHG | BODY MASS INDEX: 21.56 KG/M2 | HEIGHT: 74 IN | SYSTOLIC BLOOD PRESSURE: 109 MMHG | HEART RATE: 110 BPM | WEIGHT: 168 LBS

## 2023-09-19 DIAGNOSIS — F42.8 OTHER OBSESSIVE-COMPULSIVE DISORDERS: ICD-10-CM

## 2023-09-19 DIAGNOSIS — Z79.899 LONG TERM CURRENT USE OF ANTIPSYCHOTIC MEDICATION: ICD-10-CM

## 2023-09-19 DIAGNOSIS — F31.64 SEVERE MIXED BIPOLAR I DISORDER WITH PSYCHOTIC FEATURES (HCC): ICD-10-CM

## 2023-09-19 PROCEDURE — 3074F SYST BP LT 130 MM HG: CPT

## 2023-09-19 PROCEDURE — 3079F DIAST BP 80-89 MM HG: CPT

## 2023-09-19 PROCEDURE — 99213 OFFICE O/P EST LOW 20 MIN: CPT | Mod: GE

## 2023-09-19 ASSESSMENT — FIBROSIS 4 INDEX: FIB4 SCORE: 0.34

## 2023-09-20 DIAGNOSIS — F41.1 GENERALIZED ANXIETY DISORDER: ICD-10-CM

## 2023-09-20 RX ORDER — QUETIAPINE 200 MG/1
200 TABLET, FILM COATED, EXTENDED RELEASE ORAL NIGHTLY
Qty: 90 TABLET | Refills: 0 | Status: SHIPPED | OUTPATIENT
Start: 2023-09-20 | End: 2023-09-25

## 2023-09-20 RX ORDER — PROPRANOLOL HYDROCHLORIDE 10 MG/1
10 TABLET ORAL 2 TIMES DAILY PRN
Qty: 30 TABLET | Refills: 1 | Status: SHIPPED | OUTPATIENT
Start: 2023-09-20 | End: 2023-10-16 | Stop reason: SDUPTHER

## 2023-09-20 ASSESSMENT — ENCOUNTER SYMPTOMS
NEUROLOGICAL NEGATIVE: 1
RESPIRATORY NEGATIVE: 1
GASTROINTESTINAL NEGATIVE: 1
MUSCULOSKELETAL NEGATIVE: 1
CONSTITUTIONAL NEGATIVE: 1
EYES NEGATIVE: 1
PALPITATIONS: 1

## 2023-09-21 PROBLEM — F42.9 OBSESSIVE COMPULSIVE DISORDER: Status: ACTIVE | Noted: 2023-09-21

## 2023-09-21 NOTE — ASSESSMENT & PLAN NOTE
Stable  R/O DAVID, PD, PTSD, and ASD  Medications:  -Continue Propranolol 10mg BID PRN  -Consider SSRI  Other:  -Refer for psychotherapy  -Obtain formal neuropsych testing

## 2023-09-21 NOTE — ASSESSMENT & PLAN NOTE
Medications:  -Continue Seroquel 400mg daily (SWITCH to XR at night)  Other:  -Obtain outside records

## 2023-09-21 NOTE — PROGRESS NOTES
"[facility] Psychiatric [facility]  Medication Management Note    Evaluation completed by: Kashif Shaikh M.D.   Date of Service: 09/19/2023  Appointment type: in-office appointment.    Information below was collected from: patient    Special language or communication needs: No  Responded to any questions about patient rights: Yes  Reviewed limits of confidentiality: Yes  Confidentiality: The patient was informed that his medical records are confidential except for use by the treatment team in this clinic and others involved in his care.  Records may be shared with outside entities if the patient signs a release of information.  Information may be shared with appropriate authorities without a release of information to report instances of child/elder abuse or if it is determined he is in imminent risk of harm to self or others.     CHIEF COMPLAINT/REASON FOR VISIT  \"I'd like to switch to the XR\"    HISTORY OF PRESENT ILLNESS  Patient is a 22 y.o. old male with hx of bipolar I who presents today for follow up management of mood and anxiety/OCD.  Pt was last seen on 7/25/23, at which time the plan was to continue all medications.      Patient reports that since his last appt his mood has been stable without evidence of depressive or manic episodes. He reports that he is having side effects from his Seroquel IR 200mg at night of subjective sedation, dizziness, and increased heart rate. As a result, he would like to switch off of IR and to Seroquel XR for both doses. Once daily dosing was discussed as the preferred administration schedule and offered but patient declined stating that taking medications BID is part of his daily routine. Of note, patient reports that since starting Seroquel in 2018, he has had improved mood and decreased chronic anger as well as fewer explosive outbursts.    Patient made conflicting statements regarding his anxiety since last appt. He initially reported an overall increase in baseline " "anxiety as well as increase in frequency of his panic attacks. He describes this panic attacks as occurring more than once and at all times of day, often related to anxiety about physical symptoms and possible underlying medical disease, but also states that these panic attacks most often occur at night, usually every night. He states that these panic attacks occur in his sleep and often cause nightmares as he is falling asleep. Later, he reports that in the past week since cutting back on caffeine, his anxiety has improved and has only had one panic attack in the past 4-5 days.    PSYCHOSOCIAL CHANGES SINCE LAST VISIT   Patient reports that 1 week ago he cut back on his caffeine intake from 2-3 cups of coffee daily to approx 1 cup per day. He also reports that he has discovered numerous coping strategies that have been helpful in aborting his panic attacks such as breathing exercises and spraying cold water on his face to trigger his \"mammalian dive reflex\".    CURRENT MEDICATIONS, ADHERENCE, AND SIDE EFFECTS   Seroquel XR 200mg QAM; reports adherence with good effect; denies any side effects  Seroquel IR 200mg QPM; reports adherence and side effects described above  Propranolol 10mg BID PRN; reports that he has not used this since receiving prescription from Dr. Foley but is open to trying it for severe anxiety  OTC Melatonin at bedtime as needed      PSYCHIATRIC REVIEW OF SYSTEMS  Depression: Denies depressed mood, anhedonia, or SI.  Anxiety: See HPI   Panic: See HPI   OCD: Reports intrusive and obsessive thoughts about his physical health and pedophilia which are ego-dystonic and that he attempts to suppress/ignore them. Reports that recently he has attempted to be more accepting of thoughts and let them pass on his own which has improved the associated anxiety/distress.  Angelita: Denies any symptoms of angelita/hypomania currently. Reports that his last manic episode was in July 2018 and led to hospitalization at " "[facility] for 1.5 weeks and involved HI, \"rage episodes\", and impulsivity. Screened positive on 7 questions of the CIDI tool.  Sleep: Reports that his sleep is \"way better\"; approx 8-9 hrs per night. Previously 10-12 hours per night with daytime fatigue. States he has been going to sleep earlier in the evening in order to be asleep by the time he has his usual evening panic attack.  Behavioral: Denies any recent verbal or physical emotional outbursts.    MEDICAL REVIEW OF SYSTEMS  Review of Systems   Constitutional: Negative.    HENT: Negative.     Eyes: Negative.    Respiratory: Negative.     Cardiovascular:  Positive for palpitations.   Gastrointestinal: Negative.    Musculoskeletal: Negative.    Neurological: Negative.        CURRENT MEDICATIONS    Current Outpatient Medications on File Prior to Visit   Medication Sig Dispense Refill    quetiapine (SEROQUEL XR) 200 MG XR tablet Take 1 Tablet by mouth every day. 90 Tablet 0     No current facility-administered medications on file prior to visit.       ALLERGIES  No Known Allergies     PAST PSYCHIATRIC HISTORY  Please see psych eval by Dr. Shaikh on 7/25/23.    SOCIAL HISTORY SUMMARY  Please see psych eval by Dr. Shaikh on 7/25/23.      MEDICAL HISTORY  Past Medical History:   Diagnosis Date    Bipolar 1 disorder (HCC)      History reviewed. No pertinent surgical history.         FAMILY HISTORY  Family History   Problem Relation Age of Onset    Depression Mother     Depression Father     OCD Father     Bipolar disorder Father     Depression Brother     Schizophrenia Cousin          PHYSICAL EXAMINATION  Vital signs: /86 (BP Location: Left arm, Patient Position: Sitting, BP Cuff Size: Adult)   Pulse (!) 110 Comment: Double check with amelia pulse ox after 2 minutes, pt sitting  Ht 1.88 m (6' 2\")   Wt 76.2 kg (168 lb)   BMI 21.57 kg/m²   Musculoskeletal: Gait is normal. No gross abnormalities noted.   Abnormal movements: none observed    MENTAL STATUS " "EXAMINATION    General: Appears stated age and exhibits grooming which is appropriate and casual.  Hygiene is adequate.     Behavior: Pt is calm and cooperative with interview and hyperactive.  No apparent distress.  Eye contact is limited/fleeting.   Psychomotor: Mild psychomotor agitation/restlessness noted but retardation not noted.  Tics or tremors not noted.  Speech: rate within normal limits, volume within normal limits, and hypertalkative  Language: Fluent in English  Mood: \"good\"  Affect:  Bright, mildly anxious; non-labile; average range; congruent to mood; appropriate to content ,  Thought Process: Logical and Goal-directed  Thought Content: denies suicidal ideation, denies homicidal ideation. Obsessions and intrusive thoughts  Perception: denies auditory hallucinations, denies visual hallucinations. No delusions noted on interview.   Attention span and concentration: Grossly intact  Orientation: Alert and Fully Oriented  Recent and remote memory: No gross evidence of memory deficits  Insight: Adequate  Judgment: Good    SAFETY ASSESSMENT - RISK TO SELF  Current suicide attempts or self harm: No  Past suicide attempts or self harm: Yes  History of suicide by family member: No  History of suicide by friend/significant other: No  Recent change in amount/specificity/intensity of suicidal thoughts or self-harm behavior: No  Ongoing substance use disorder: No  Current access to firearms, medications, or other identified means of suicide/self-harm: No  If yes, willing to restrict access to means of suicide/self-harm: N/a  Protective factors present: Yes, strong social support     SAFETY ASSESSMENT - RISK TO OTHERS  Current aggressive behavior or risk to others: No  Past aggressive behavior or risk to others: Yes  Recent change in amount/specificity/intensity of thoughts or threats to harm others? No  Current access to firearms/other identified means of harm? No  If yes, willing to restrict access to " weapons/means of harm? N/a     CURRENT RISK ASSESSMENT       Suicide: Low       Homicide: Low       Self-Harm: Low       Relapse: Not applicable       Crisis Safety Plan Reviewed Not Indicated    [place]  records   reviewed.  No concerns about misuse of controlled substance.    ASSESSMENT  Patient is a 22 y.o. old male with hx of bipolar I who presents today for follow up management of mood and anxiety/OCD.   Pt was last seen on 7/25/23, at which time the plan was to continue all medications.      Bipolar I: More clear evidence obtained today suggesting history of manic episode. Continue Seroquel 400mg daily (switch nighttime dose to XR per patient preference); will obtain baseline monitoring labs and EKG.  Anxiety/OCD: Improved over the past week due to decrease caffeine intake; no changes to plan at this time. Obtain DAVID-7 at next visit and consider SSRI.  Sleep: Possible hypnagogic hallucinations but without evidence of daytime somnolence or cataplexy, will continue to observe and reassess at next appt. Consider sleep study if narcolepsy is suspected.    Biopsychosocial Formulation:  (Carried forward from my note on 7/25/23 and updated):  Biological-Family psychiatric history of bipolar I. Personal hx of manic episode, anxiety, and likely OCD. Med hx of Lamictal and Lithium; no evidence of SSRI trial. Poor sleep patterns; diet and exercise patterns remain unknown at this time.  Psychological-Long-standing low distress tolerance with lack of psychological flexibility leading to behavioral outbursts. Struggles with sense of control leading to functional impairments. Evidence of intrusive thoughts.  Social-Patient has meaningful work and strong social support. History of interpersonal struggles, trauma, and strained relationship with father.    DIAGNOSES/PLAN  Severe mixed bipolar I disorder with psychotic features (HCC)  Medications:  -Continue Seroquel 400mg daily (SWITCH to XR at night)  Other:  -Obtain outside  records    Obsessive compulsive disorder  Stable  R/O DAVID, PD, PTSD, and ASD  Medications:  -Continue Propranolol 10mg BID PRN  -Consider SSRI  Other:  -Refer for psychotherapy  -Obtain formal neuropsych testing     As of 9/27/23:  -Insurance likely to deny Seroquel  mg BID; will change to 400 mg QHS  -Phoned patient and discussed above plan    Sleep disorder, unspecified  R/O narcolepsy  -Observe and reassess  -Consider sleep consult/study as indicated    Medication options, alternatives (including no medications) and medication risks/benefits/side effects were discussed in detail.  The patient was advised to call, message clinician on [employer], or come in to the clinic if symptoms worsen or if questions/issues regarding their medications arise.  The patient verbalized understanding and agreement.    The patient was educated to call 911, call the suicide hotline, or go to the local ER if having thoughts of suicide or homicide.  The patient verbalized understanding and agreement.   The proposed treatment plan was discussed with the patient who was provided the opportunity to ask questions and make suggestions regarding alternative treatment. Patient verbalized understanding and expressed agreement with the plan.      Return to clinic in 4-6 weeks or sooner if symptoms worsen.    This appointment was supervised by attending psychiatrist.  See attending attestation for more details.       Kashif Shaikh M.D.

## 2023-09-25 ENCOUNTER — HOSPITAL ENCOUNTER (EMERGENCY)
Facility: MEDICAL CENTER | Age: 23
End: 2023-09-25
Attending: EMERGENCY MEDICINE
Payer: COMMERCIAL

## 2023-09-25 ENCOUNTER — APPOINTMENT (OUTPATIENT)
Dept: RADIOLOGY | Facility: MEDICAL CENTER | Age: 23
End: 2023-09-25
Attending: EMERGENCY MEDICINE
Payer: COMMERCIAL

## 2023-09-25 VITALS
OXYGEN SATURATION: 99 % | RESPIRATION RATE: 20 BRPM | HEIGHT: 74 IN | WEIGHT: 167.77 LBS | DIASTOLIC BLOOD PRESSURE: 83 MMHG | TEMPERATURE: 97 F | SYSTOLIC BLOOD PRESSURE: 122 MMHG | HEART RATE: 80 BPM | BODY MASS INDEX: 21.53 KG/M2

## 2023-09-25 DIAGNOSIS — R00.2 PALPITATIONS: ICD-10-CM

## 2023-09-25 DIAGNOSIS — F41.9 ANXIETY: ICD-10-CM

## 2023-09-25 LAB
ALBUMIN SERPL BCP-MCNC: 4.7 G/DL (ref 3.2–4.9)
ALBUMIN/GLOB SERPL: 1.6 G/DL
ALP SERPL-CCNC: 70 U/L (ref 30–99)
ALT SERPL-CCNC: 23 U/L (ref 2–50)
ANION GAP SERPL CALC-SCNC: 12 MMOL/L (ref 7–16)
AST SERPL-CCNC: 16 U/L (ref 12–45)
BASOPHILS # BLD AUTO: 0.6 % (ref 0–1.8)
BASOPHILS # BLD: 0.05 K/UL (ref 0–0.12)
BILIRUB SERPL-MCNC: 0.3 MG/DL (ref 0.1–1.5)
BUN SERPL-MCNC: 11 MG/DL (ref 8–22)
CALCIUM ALBUM COR SERPL-MCNC: 9 MG/DL (ref 8.5–10.5)
CALCIUM SERPL-MCNC: 9.6 MG/DL (ref 8.4–10.2)
CHLORIDE SERPL-SCNC: 106 MMOL/L (ref 96–112)
CO2 SERPL-SCNC: 24 MMOL/L (ref 20–33)
CREAT SERPL-MCNC: 1.02 MG/DL (ref 0.5–1.4)
EKG IMPRESSION: NORMAL
EOSINOPHIL # BLD AUTO: 0.69 K/UL (ref 0–0.51)
EOSINOPHIL NFR BLD: 8.2 % (ref 0–6.9)
ERYTHROCYTE [DISTWIDTH] IN BLOOD BY AUTOMATED COUNT: 37.5 FL (ref 35.9–50)
GFR SERPLBLD CREATININE-BSD FMLA CKD-EPI: 107 ML/MIN/1.73 M 2
GLOBULIN SER CALC-MCNC: 3 G/DL (ref 1.9–3.5)
GLUCOSE SERPL-MCNC: 100 MG/DL (ref 65–99)
HCT VFR BLD AUTO: 47.5 % (ref 42–52)
HGB BLD-MCNC: 16 G/DL (ref 14–18)
IMM GRANULOCYTES # BLD AUTO: 0.01 K/UL (ref 0–0.11)
IMM GRANULOCYTES NFR BLD AUTO: 0.1 % (ref 0–0.9)
LYMPHOCYTES # BLD AUTO: 2.84 K/UL (ref 1–4.8)
LYMPHOCYTES NFR BLD: 33.9 % (ref 22–41)
MCH RBC QN AUTO: 27.8 PG (ref 27–33)
MCHC RBC AUTO-ENTMCNC: 33.7 G/DL (ref 32.3–36.5)
MCV RBC AUTO: 82.6 FL (ref 81.4–97.8)
MONOCYTES # BLD AUTO: 0.56 K/UL (ref 0–0.85)
MONOCYTES NFR BLD AUTO: 6.7 % (ref 0–13.4)
NEUTROPHILS # BLD AUTO: 4.22 K/UL (ref 1.82–7.42)
NEUTROPHILS NFR BLD: 50.5 % (ref 44–72)
NRBC # BLD AUTO: 0 K/UL
NRBC BLD-RTO: 0 /100 WBC (ref 0–0.2)
PLATELET # BLD AUTO: 260 K/UL (ref 164–446)
PMV BLD AUTO: 9.2 FL (ref 9–12.9)
POTASSIUM SERPL-SCNC: 4.2 MMOL/L (ref 3.6–5.5)
PROT SERPL-MCNC: 7.7 G/DL (ref 6–8.2)
RBC # BLD AUTO: 5.75 M/UL (ref 4.7–6.1)
SODIUM SERPL-SCNC: 142 MMOL/L (ref 135–145)
TROPONIN T SERPL-MCNC: <6 NG/L (ref 6–19)
WBC # BLD AUTO: 8.4 K/UL (ref 4.8–10.8)

## 2023-09-25 PROCEDURE — 85025 COMPLETE CBC W/AUTO DIFF WBC: CPT

## 2023-09-25 PROCEDURE — 36415 COLL VENOUS BLD VENIPUNCTURE: CPT

## 2023-09-25 PROCEDURE — 71045 X-RAY EXAM CHEST 1 VIEW: CPT

## 2023-09-25 PROCEDURE — 84484 ASSAY OF TROPONIN QUANT: CPT

## 2023-09-25 PROCEDURE — 99284 EMERGENCY DEPT VISIT MOD MDM: CPT

## 2023-09-25 PROCEDURE — 93005 ELECTROCARDIOGRAM TRACING: CPT | Performed by: EMERGENCY MEDICINE

## 2023-09-25 PROCEDURE — 80053 COMPREHEN METABOLIC PANEL: CPT

## 2023-09-25 PROCEDURE — 93005 ELECTROCARDIOGRAM TRACING: CPT

## 2023-09-25 RX ORDER — QUETIAPINE 200 MG/1
200 TABLET, FILM COATED, EXTENDED RELEASE ORAL 2 TIMES DAILY
Qty: 60 TABLET | Refills: 2 | Status: SHIPPED | OUTPATIENT
Start: 2023-09-25 | End: 2023-09-27

## 2023-09-25 ASSESSMENT — FIBROSIS 4 INDEX: FIB4 SCORE: 0.34

## 2023-09-25 NOTE — ED PROVIDER NOTES
"CHIEF COMPLAINT  Chief Complaint   Patient presents with    Palpitations     Started this am after waking up    Anxiety     Pt reports HX of anxiety and PTSD, states today feels different  \"I don't usually have palpitations like this\"       LIMITATION TO HISTORY   Select: none    HPI    Jerome La is a 22 y.o. male who presents to the Emergency Department complaining of palpitations and a panic attack.  The patient has a significant history of panic attacks.  The patient has been on Seroquel and Inderal for this.  The patient's been on that since about 2018.  He was warned however, being on Seroquel that it can cause some \"cardiac abnormalities\" so that if there is any concerns he should have it checked out.  Today he woke up he had a typical type of panic attack but then felt like his heart was really fast and felt like it was palpitating which she has not really felt before and because of that and the warnings from before he presents emerged department for evaluation.  Upon arrival here he states that has been able to calm down he is actually feeling normal does not feel the palpitations anymore but was concerned and is here for evaluation.    OUTSIDE HISTORIAN(S):  Select: Mother also provided history    EXTERNAL RECORDS REVIEWED  Select: Other patient was seen at [facility] emergency department on 6/22/2023 for a boxer's fracture      PAST MEDICAL HISTORY  Past Medical History:   Diagnosis Date    Bipolar 1 disorder (HCC)     PTSD (post-traumatic stress disorder)      .    SURGICAL HISTORY  History reviewed. No pertinent surgical history.      FAMILY HISTORY  Family History   Problem Relation Age of Onset    Depression Mother     Depression Father     OCD Father     Bipolar disorder Father     Depression Brother     Schizophrenia Cousin           SOCIAL HISTORY  Social History     Socioeconomic History    Marital status: Single     Spouse name: Not on file    Number of children: Not on file    Years of " "education: Not on file    Highest education level: Not on file   Occupational History    Not on file   Tobacco Use    Smoking status: Never    Smokeless tobacco: Never   Substance and Sexual Activity    Alcohol use: No    Drug use: No    Sexual activity: Not on file   Other Topics Concern    Not on file   Social History Narrative    Not on file     Social Determinants of Health     Financial Resource Strain: Not on file   Food Insecurity: Not on file   Transportation Needs: Not on file   Physical Activity: Not on file   Stress: Not on file   Social Connections: Not on file   Intimate Partner Violence: Not on file   Housing Stability: Not on file         CURRENT MEDICATIONS  No current facility-administered medications on file prior to encounter.     Current Outpatient Medications on File Prior to Encounter   Medication Sig Dispense Refill    propranolol (INDERAL) 10 MG Tab Take 1 Tablet by mouth 2 times a day as needed (As discussed, up to two times every day as needed for severe anxiety.) for up to 30 days. 30 Tablet 1    quetiapine (SEROQUEL XR) 200 MG XR tablet Take 1 Tablet by mouth every evening for 90 days. 90 Tablet 0    quetiapine (SEROQUEL XR) 200 MG XR tablet Take 1 Tablet by mouth every day. 90 Tablet 0           ALLERGIES  No Known Allergies    PHYSICAL EXAM  VITAL SIGNS:/83   Pulse 80   Temp 36.7 °C (98.1 °F) (Temporal)   Resp 20   Ht 1.88 m (6' 2\")   Wt 76.1 kg (167 lb 12.3 oz)   SpO2 99%   BMI 21.54 kg/m²     Constitutional: Well-developed no acute distress mildly anxious but otherwise appropriate  HENT: Normocephalic, Atraumatic, Bilateral external ears normal.  Eyes:  conjunctiva are normal.   Neck: Supple.  Nontender midline  Cardiovascular: Regular rate and rhythm without murmurs gallops or rubs.   Thorax & Lungs: No respiratory distress. Breathing comfortably. Lungs are clear to auscultation bilaterally, there are no wheezes no rales. Chest wall is nontender.  Abdomen: Soft, non " distended, non tender   Skin: Warm, Dry, No erythema,   Back: No tenderness, No CVA tenderness.  Musculoskeletal: No clubbing cyanosis or edema good range of motion   Neurologic: Alert & oriented x 3, normal sensation moving all extremities appears normal   Psychiatric: Affect normal, Judgment normal, Mood normal.         DIAGNOSTIC STUDIES / PROCEDURES  EKG  I have independently interpreted this EKG  Interpreted below by myself     LABS  Results for orders placed or performed during the hospital encounter of 09/25/23   CBC with Differential   Result Value Ref Range    WBC 8.4 4.8 - 10.8 K/uL    RBC 5.75 4.70 - 6.10 M/uL    Hemoglobin 16.0 14.0 - 18.0 g/dL    Hematocrit 47.5 42.0 - 52.0 %    MCV 82.6 81.4 - 97.8 fL    MCH 27.8 27.0 - 33.0 pg    MCHC 33.7 32.3 - 36.5 g/dL    RDW 37.5 35.9 - 50.0 fL    Platelet Count 260 164 - 446 K/uL    MPV 9.2 9.0 - 12.9 fL    Neutrophils-Polys 50.50 44.00 - 72.00 %    Lymphocytes 33.90 22.00 - 41.00 %    Monocytes 6.70 0.00 - 13.40 %    Eosinophils 8.20 (H) 0.00 - 6.90 %    Basophils 0.60 0.00 - 1.80 %    Immature Granulocytes 0.10 0.00 - 0.90 %    Nucleated RBC 0.00 0.00 - 0.20 /100 WBC    Neutrophils (Absolute) 4.22 1.82 - 7.42 K/uL    Lymphs (Absolute) 2.84 1.00 - 4.80 K/uL    Monos (Absolute) 0.56 0.00 - 0.85 K/uL    Eos (Absolute) 0.69 (H) 0.00 - 0.51 K/uL    Baso (Absolute) 0.05 0.00 - 0.12 K/uL    Immature Granulocytes (abs) 0.01 0.00 - 0.11 K/uL    NRBC (Absolute) 0.00 K/uL   Complete Metabolic Panel (CMP)   Result Value Ref Range    Sodium 142 135 - 145 mmol/L    Potassium 4.2 3.6 - 5.5 mmol/L    Chloride 106 96 - 112 mmol/L    Co2 24 20 - 33 mmol/L    Anion Gap 12.0 7.0 - 16.0    Glucose 100 (H) 65 - 99 mg/dL    Bun 11 8 - 22 mg/dL    Creatinine 1.02 0.50 - 1.40 mg/dL    Calcium 9.6 8.4 - 10.2 mg/dL    Correct Calcium 9.0 8.5 - 10.5 mg/dL    AST(SGOT) 16 12 - 45 U/L    ALT(SGPT) 23 2 - 50 U/L    Alkaline Phosphatase 70 30 - 99 U/L    Total Bilirubin 0.3 0.1 - 1.5 mg/dL     Albumin 4.7 3.2 - 4.9 g/dL    Total Protein 7.7 6.0 - 8.2 g/dL    Globulin 3.0 1.9 - 3.5 g/dL    A-G Ratio 1.6 g/dL   Troponin STAT   Result Value Ref Range    Troponin T <6 6 - 19 ng/L   ESTIMATED GFR   Result Value Ref Range    GFR (CKD-EPI) 107 >60 mL/min/1.73 m 2   EKG   Result Value Ref Range    Report       [facility] Emergency Dept.    Test Date:  2023  Pt Name:    SHAVON RETANA     Department: [facility]  MRN:        [id number]                      [facility]  Gender:     Male                         Technician: [id number]  :        2000                   Requested By:ER TRIAGE PROTOCOL  Order #:    [id number]                    Reading MD: JAK WHITTINGTON MD    Measurements  Intervals                                Axis  Rate:       92                           P:          80  UT:         124                          QRS:        62  QRSD:       87                           T:          45  QT:         331  QTc:        410    Interpretive Statements  Sinus arrhythmia  Baseline wander in lead(s) V1  No previous ECG available for comparison  otherwise normal ECG  Electronically Signed On 2023 08:20:12 PDT by JAK WHITTINGTON MD           RADIOLOGY  I have independently interpreted the diagnostic imaging associated with this visit and am waiting the final reading from the radiologist.   My preliminary interpretation is as follows: No significant or pulmonary abnormality seen      Radiologist interpretation:   DX-CHEST-PORTABLE (1 VIEW)   Final Result      Negative single view of the chest.           COURSE & MEDICAL DECISION MAKING    ED COURSE:    ED Observation Status? Yes;I am placing the patient in to an observation status due to a diagnostic uncertainty as well as therapeutic intensity. Patient placed in observation status at 8:48 AM 2023    Observation plan is as follows: Patient is still very anxious I will place him on a cardiac monitor.  I  will evaluate laboratory studies to evaluate for electrolyte abnormalities as well.  I did offer the patient medications for anxiety at this point he does not wish to have any.    INTERVENTIONS BY ME:  Medications - No data to display      Rechecks patient has only had a couple of PVCs but otherwise no other ectopy on monitor.        Upon Reevaluation, the patient's condition has: Improved; and will be discharged.  Patient discharged from ED observation at 9:33 AM 09/25/23  .      INITIAL ASSESSMENT, COURSE AND PLAN  Care Narrative: Patient presents for evaluation.  The patient is rather anxious upon initial evaluation.  Patient was placed on continuous monitor there is only occasional PVCs but otherwise no other significant ectopy EKG is normal laboratory studies are normal.  At this point I do feel that his symptomatology is secondary to his anxiety.  After long discussion the patient agrees at this point is recommended for him to continue follow-up with his psychiatrist and counseling physicians.  The patient should return as needed.       ADDITIONAL PROBLEM LIST  1.  Severe anxiety    DISPOSITION AND DISCUSSIONS  Patient is stable for discharge    Decision tools and prescription drugs considered including, but not limited to: Patient is to continue his medications.  There is no elevation to his QTc from his Seroquel.    FINAL DIAGNOSIS  1. Anxiety    2. Palpitations           The patient will return for new or worsening symptoms and is stable at the time of discharge.    The patient is referred to a primary physician for blood pressure management, diabetic screening, and for all other preventative health concerns.    I reviewed prescription monitoring program for patient's narcotic use before prescribing a scheduled drug.The patient will not drink alcohol nor drive with prescribed medications        DISPOSITION:  Patient will be discharged home in stable condition.    FOLLOW UP:  Your PCP    Schedule an  appointment as soon as possible for a visit   As needed, Return if any symptoms worsen      OUTPATIENT MEDICATIONS:  New Prescriptions    No medications on file             Electronically signed by: Russell Valenzuela M.D.,9:34 AM 09/25/23

## 2023-09-25 NOTE — ED NOTES
Medication history reviewed with pt. Med rec is complete.  Allergies reviewed, per pt  Interviewed pt with mother at bedside with permission from pt.    Patient has not had any outpatient antibiotics in the last 30 days.    Pt is not on any anticoagulants

## 2023-09-25 NOTE — ED TRIAGE NOTES
"Chief Complaint   Patient presents with    Palpitations     Started this am after waking up    Anxiety     Pt reports HX of anxiety and PTSD, states today feels different  \"I don't usually have palpitations like this\"     /85   Pulse 87   Temp 36.7 °C (98.1 °F) (Temporal)   Resp 14   Ht 1.88 m (6' 2\")   Wt 76.1 kg (167 lb 12.3 oz)   SpO2 100%   BMI 21.54 kg/m²       Pt ambulated to ED w/ visitor for c/o palpitations started this am.  Pt states has been taking prescribed Seroquel as ordered, did stop taking Lithium a few months ago.  Pt denies SI at this time.  "

## 2023-09-27 RX ORDER — QUETIAPINE 200 MG/1
200 TABLET, FILM COATED, EXTENDED RELEASE ORAL 2 TIMES DAILY
Qty: 60 TABLET | Refills: 2 | Status: SHIPPED | OUTPATIENT
Start: 2023-09-27 | End: 2023-09-27

## 2023-09-27 RX ORDER — QUETIAPINE 400 MG/1
400 TABLET, FILM COATED, EXTENDED RELEASE ORAL NIGHTLY
Qty: 30 TABLET | Refills: 2 | Status: SHIPPED | OUTPATIENT
Start: 2023-09-27 | End: 2023-11-28 | Stop reason: SDUPTHER

## 2023-09-27 NOTE — TELEPHONE ENCOUNTER
Per insurance BID dosing not covered, switching to QHS administration at the same daily dose of 400mg for Seroquel/Quetiapine XR.

## 2023-10-16 DIAGNOSIS — F41.1 GENERALIZED ANXIETY DISORDER: ICD-10-CM

## 2023-10-16 RX ORDER — PROPRANOLOL HYDROCHLORIDE 10 MG/1
10 TABLET ORAL 2 TIMES DAILY PRN
Qty: 180 TABLET | Refills: 1 | Status: SHIPPED | OUTPATIENT
Start: 2023-10-16 | End: 2024-04-13

## 2023-10-31 ENCOUNTER — OFFICE VISIT (OUTPATIENT)
Dept: BEHAVIORAL HEALTH | Facility: PSYCHIATRIC FACILITY | Age: 23
End: 2023-10-31
Payer: COMMERCIAL

## 2023-10-31 DIAGNOSIS — F42.9 OBSESSIVE-COMPULSIVE DISORDER, UNSPECIFIED TYPE: ICD-10-CM

## 2023-10-31 DIAGNOSIS — F41.1 GENERALIZED ANXIETY DISORDER: ICD-10-CM

## 2023-10-31 DIAGNOSIS — F31.64 SEVERE MIXED BIPOLAR I DISORDER WITH PSYCHOTIC FEATURES (HCC): ICD-10-CM

## 2023-10-31 PROCEDURE — 99213 OFFICE O/P EST LOW 20 MIN: CPT | Mod: GE

## 2023-10-31 RX ORDER — LAMOTRIGINE 25 MG/1
TABLET ORAL
Qty: 42 TABLET | Refills: 0 | Status: SHIPPED | OUTPATIENT
Start: 2023-10-31 | End: 2023-11-28

## 2023-10-31 RX ORDER — LAMOTRIGINE 100 MG/1
100 TABLET ORAL DAILY
Qty: 30 TABLET | Refills: 1 | Status: SHIPPED | OUTPATIENT
Start: 2023-11-27 | End: 2023-12-22 | Stop reason: SDUPTHER

## 2023-10-31 NOTE — PROGRESS NOTES
[facility]  Medication Management Note    Evaluation completed by: Kashif Shaikh M.D.   Date of Service: 10/31/2023  Appointment type: in-office appointment.    Information below was collected from: patient    Special language or communication needs: No  Responded to any questions about patient rights: Yes  Reviewed limits of confidentiality: Yes  Confidentiality: The patient was informed that his medical records are confidential except for use by the treatment team in this clinic and others involved in his care.  Records may be shared with outside entities if the patient signs a release of information.  Information may be shared with appropriate authorities without a release of information to report instances of child/elder abuse or if it is determined he is in imminent risk of harm to self or others.     CHIEF COMPLAINT/REASON FOR VISIT  Follow up    HISTORY OF PRESENT ILLNESS  Patient is a 22 y.o. old male with hx of bipolar I and OCD who presents today for follow up management of mood and anxiety.   Pt was last seen on 9/19/23, at which time the plan was to switch Seroquel to XR formulation.     Patient reports difficulty with insurance covering twice daily XR dosing but is willing to try consolidating Seroquel XR dosing to 400 mg at bedtime. Additionally he reports that since last visit he experienced a 2-3 week depressive episode with worsened anxiety/panic, decreased appetite, decreased energy and motivation, anhedonia, and fleeting passive SI. Reports that this episode slowly began improving appox 1 week ago. He reports increased frequency and severity of these mood episodes while on Seroquel alone. He states that he did not tolerate Lithium due to increased anger/irritability. He reports improved stabilization of his mood while on Lamictal last year and is unsure why he was switched to Lithium alone, denying any past side effects including a rash.    Patient reports that  propranolol remains helpful for predominately physical symptoms of his anxiety and panic such as increased heart rate. He denies any side effects and states that he wishes to continue this medication as needed for anxiety.    PSYCHOSOCIAL CHANGES SINCE LAST VISIT   None reported    CURRENT MEDICATIONS, ADHERENCE, AND SIDE EFFECTS   Seroquel  mg BID  Propranolol 10 mg BID PRN      PSYCHIATRIC REVIEW OF SYSTEMS  Depression: See HPI  Anxiety: See HPI   OCD: Ongoing symptoms, unchanged from last appt.  Angelita: No evidence of current manic episode, but patient reports feelings of mood cycling since discontinuing Lamictal last year.  Psychosis: No evidence of AVH or delusions  Autism Spectrum Disorder: Some evidence of impaired social functioning and restrictive behaviors/interests but symptom onset unknown.  Sleep: Patient reports ongoing initial insomnia but reports overall improvement since last appt.  Behavioral: No evidence verbal or physical emotional outbursts.    MEDICAL REVIEW OF SYSTEMS  Review of Systems   Constitutional: Negative.    HENT: Negative.     Eyes: Negative.    Respiratory: Negative.     Cardiovascular: Negative.    Gastrointestinal: Negative.    Musculoskeletal: Negative.    Skin: Negative.    Neurological: Negative.        CURRENT MEDICATIONS    Current Outpatient Medications on File Prior to Visit   Medication Sig Dispense Refill    propranolol (INDERAL) 10 MG Tab Take 1 Tablet by mouth 2 times a day as needed (As discussed, up to two times every day as needed for severe anxiety.) for up to 180 days. 180 Tablet 1    quetiapine (SEROQUEL XR) 400 MG XR tablet Take 1 Tablet by mouth every evening for 90 days. 30 Tablet 2     No current facility-administered medications on file prior to visit.       ALLERGIES  No Known Allergies     PAST PSYCHIATRIC HISTORY  Please see psych eval by Dr. Shaikh on 7/25/23.    SOCIAL HISTORY SUMMARY  Please see psych eval by Dr. Shaikh on 7/25/23.      MEDICAL  "HISTORY  Past Medical History:   Diagnosis Date    Bipolar 1 disorder (HCC)     PTSD (post-traumatic stress disorder)      History reviewed. No pertinent surgical history.         FAMILY HISTORY  Family History   Problem Relation Age of Onset    Depression Mother     Depression Father     OCD Father     Bipolar disorder Father     Depression Brother     Schizophrenia Cousin          PHYSICAL EXAMINATION  Vital signs: There were no vitals taken for this visit.  Musculoskeletal: Gait is normal. No gross abnormalities noted.   Abnormal movements: none observed    MENTAL STATUS EXAMINATION    General: Appears stated age and exhibits grooming which is appropriate, casual, and neat.  Hygiene is good.     Behavior: Pt is calm and cooperative with interview.  No apparent distress.  Eye contact is appropriate.   Psychomotor: Psychomotor agitation or retardation not noted.  Tics or tremors not noted.  Speech: rate within normal limits and volume within normal limits  Language: Fluent in English  Mood: \"alright\"  Affect: Full range, Congruent with content, and Anxious,  Thought Process: Logical and Goal-directed  Thought Content: denies suicidal ideation, denies homicidal ideation. Within normal limits  Perception:  no evidence of  auditory hallucinations,  or  visual hallucinations. No delusions noted on interview.   Attention span and concentration: Sufficient for conversation.  Orientation: Alert and Fully Oriented  Recent and remote memory: No gross evidence of memory deficits  Insight: Good  Judgment: Good    SAFETY ASSESSMENT - RISK TO SELF  Current suicide attempts or self harm: No  Past suicide attempts or self harm: Yes  History of suicide by family member: No  History of suicide by friend/significant other: No  Recent change in amount/specificity/intensity of suicidal thoughts or self-harm behavior: Yes, frequency but not intensity or intent; denies plan or preparatory behavior  Ongoing substance use disorder: " No  Current access to firearms, medications, or other identified means of suicide/self-harm: No  If yes, willing to restrict access to means of suicide/self-harm: N/a  Protective factors present: Yes, strong social support     SAFETY ASSESSMENT - RISK TO OTHERS  Current aggressive behavior or risk to others: No  Past aggressive behavior or risk to others: Yes  Recent change in amount/specificity/intensity of thoughts or threats to harm others? No  Current access to firearms/other identified means of harm? No  If yes, willing to restrict access to weapons/means of harm? N/a     CURRENT RISK ASSESSMENT       Suicide: Low       Homicide: Low       Self-Harm: Low       Relapse: Not applicable       Crisis Safety Plan Reviewed Not Indicated    [place]  records   reviewed.  No concerns about misuse of controlled substance.    ASSESSMENT  Patient is a 22 y.o. old male with hx of bipolar I and OCD who presents today for follow up management of mood and anxiety. Pt was last seen on 9/19/23, at which time the plan was to switch Seroquel to XR formulation. Discussed consolidating Seroquel dose to 400 mg at bedtime. Patient describes episodes of mood cycling while on Seroquel alone and restarting Lamictal is indicated at this time; patient expressed understanding and agreement. Plan to start Lamictal at 25 mg for two weeks, then increase to 50 mg for two weeks, then increase to 100 mg until next appt.    Biopsychosocial Formulation:  (Carried forward from my note on 9/19/23 and updated):  Biological-Family psychiatric history of bipolar I. Personal hx of manic episode, anxiety, and likely OCD. Past med trials include Lamictal and Lithium; no evidence of SSRI trial. Poor sleep patterns; diet and exercise patterns remain unknown at this time.  Psychological-Long-standing low distress tolerance with lack of psychological flexibility leading to behavioral outbursts. Struggles with sense of control leading to functional  impairments. Evidence of several different intrusive thoughts.  Social-Patient has meaningful work and strong social support. History of interpersonal struggles, trauma, and strained relationship with father.    DIAGNOSES/PLAN  Severe mixed bipolar I disorder with psychotic features (HCC)  Family history of bipolar  Positive screen on CIDI (yes to 7) with evidence of mood cycling, last episode depressive  Medications:  -Continue Seroquel XR 400mg QHS  -START Lamictal 25 mg for 2 weeks; THEN 50 mg for 2 weeks; THEN 100 mg until next appt. Counseled on risks, benefits, side effects, and alternatives including no medication at all.  Other:  -Obtain outside records    Obsessive compulsive disorder  Stable  R/O DAVID, PD, PTSD, and ASD  Medications:  -Continue Propranolol 10mg BID PRN  -Consider SSRI or Buspar  Other:  -Refer for psychotherapy  -Obtain formal neuropsych testing      Medication options, alternatives (including no medications) and medication risks/benefits/side effects were discussed in detail.  The patient was advised to call, message clinician on [employer], or come in to the clinic if symptoms worsen or if questions/issues regarding their medications arise.  The patient verbalized understanding and agreement.    The patient was educated to call 911, call the suicide hotline, or go to the local ER if having thoughts of suicide or homicide.  The patient verbalized understanding and agreement.   The proposed treatment plan was discussed with the patient who was provided the opportunity to ask questions and make suggestions regarding alternative treatment. Patient verbalized understanding and expressed agreement with the plan.      Return to clinic in 5 weeks or sooner if symptoms worsen.    This appointment was supervised by attending psychiatrist.  See attending attestation for more details.       Kashif Shaikh M.D.

## 2023-11-01 ASSESSMENT — ENCOUNTER SYMPTOMS
EYES NEGATIVE: 1
CARDIOVASCULAR NEGATIVE: 1
RESPIRATORY NEGATIVE: 1
NEUROLOGICAL NEGATIVE: 1
GASTROINTESTINAL NEGATIVE: 1
CONSTITUTIONAL NEGATIVE: 1
MUSCULOSKELETAL NEGATIVE: 1

## 2023-11-02 NOTE — ASSESSMENT & PLAN NOTE
Stable  R/O DAVID, PD, PTSD, and ASD  Medications:  -Continue Propranolol 10mg BID PRN  -Consider SSRI or Buspar  Other:  -Refer for psychotherapy  -Obtain formal neuropsych testing

## 2023-11-02 NOTE — ASSESSMENT & PLAN NOTE
Family history of bipolar  Positive screen on CIDI (yes to 7) with evidence of mood cycling, last episode depressive  Medications:  -Continue Seroquel XR 400mg QHS  -START Lamictal 25 mg for 2 weeks; THEN 50 mg for 2 weeks; THEN 100 mg until next appt. Counseled on risks, benefits, side effects, and alternatives including no medication at all.  Other:  -Obtain outside records

## 2023-11-28 DIAGNOSIS — F31.64 SEVERE MIXED BIPOLAR I DISORDER WITH PSYCHOTIC FEATURES (HCC): Primary | ICD-10-CM

## 2023-11-28 RX ORDER — QUETIAPINE 400 MG/1
400 TABLET, FILM COATED, EXTENDED RELEASE ORAL NIGHTLY
Qty: 30 TABLET | Refills: 0 | Status: SHIPPED | OUTPATIENT
Start: 2023-11-28 | End: 2023-12-26 | Stop reason: SDUPTHER

## 2023-11-28 NOTE — TELEPHONE ENCOUNTER
Received fax request for refill on Seroquel XR 400mg 1 tab poqhs. Pharmacy previously inactivated rx when previous rx of 200mg XR 2 tab daily was filled. Please approve rx. Next appt is scheduled for 12/05/23.

## 2023-12-05 ENCOUNTER — APPOINTMENT (OUTPATIENT)
Dept: BEHAVIORAL HEALTH | Facility: PSYCHIATRIC FACILITY | Age: 23
End: 2023-12-05
Payer: COMMERCIAL

## 2023-12-22 DIAGNOSIS — F31.64 SEVERE MIXED BIPOLAR I DISORDER WITH PSYCHOTIC FEATURES (HCC): ICD-10-CM

## 2023-12-26 DIAGNOSIS — F31.64 SEVERE MIXED BIPOLAR I DISORDER WITH PSYCHOTIC FEATURES (HCC): ICD-10-CM

## 2023-12-26 RX ORDER — LAMOTRIGINE 100 MG/1
100 TABLET ORAL DAILY
Qty: 30 TABLET | Refills: 1 | Status: SHIPPED | OUTPATIENT
Start: 2023-12-26 | End: 2024-01-16 | Stop reason: SDUPTHER

## 2023-12-26 RX ORDER — QUETIAPINE 400 MG/1
400 TABLET, FILM COATED, EXTENDED RELEASE ORAL NIGHTLY
Qty: 90 TABLET | Refills: 1 | Status: SHIPPED | OUTPATIENT
Start: 2023-12-26 | End: 2024-01-16 | Stop reason: SDUPTHER

## 2024-01-16 ENCOUNTER — OFFICE VISIT (OUTPATIENT)
Dept: BEHAVIORAL HEALTH | Facility: PSYCHIATRIC FACILITY | Age: 24
End: 2024-01-16
Payer: COMMERCIAL

## 2024-01-16 VITALS
WEIGHT: 169 LBS | HEART RATE: 156 BPM | HEIGHT: 75 IN | BODY MASS INDEX: 21.01 KG/M2 | OXYGEN SATURATION: 97 % | SYSTOLIC BLOOD PRESSURE: 132 MMHG | DIASTOLIC BLOOD PRESSURE: 90 MMHG

## 2024-01-16 DIAGNOSIS — Z79.899 LONG TERM CURRENT USE OF ANTIPSYCHOTIC MEDICATION: ICD-10-CM

## 2024-01-16 DIAGNOSIS — F31.64 SEVERE MIXED BIPOLAR I DISORDER WITH PSYCHOTIC FEATURES (HCC): ICD-10-CM

## 2024-01-16 DIAGNOSIS — F42.9 OBSESSIVE-COMPULSIVE DISORDER, UNSPECIFIED TYPE: ICD-10-CM

## 2024-01-16 DIAGNOSIS — F41.1 GENERALIZED ANXIETY DISORDER: ICD-10-CM

## 2024-01-16 PROCEDURE — 3075F SYST BP GE 130 - 139MM HG: CPT

## 2024-01-16 PROCEDURE — 3080F DIAST BP >= 90 MM HG: CPT

## 2024-01-16 PROCEDURE — 99214 OFFICE O/P EST MOD 30 MIN: CPT | Mod: GC

## 2024-01-16 RX ORDER — LAMOTRIGINE 100 MG/1
100 TABLET ORAL DAILY
Qty: 90 TABLET | Refills: 1 | Status: SHIPPED | OUTPATIENT
Start: 2024-01-16 | End: 2024-03-05 | Stop reason: SDUPTHER

## 2024-01-16 RX ORDER — QUETIAPINE 400 MG/1
400 TABLET, FILM COATED, EXTENDED RELEASE ORAL NIGHTLY
Qty: 90 TABLET | Refills: 1 | Status: SHIPPED | OUTPATIENT
Start: 2024-01-16 | End: 2024-03-05 | Stop reason: SDUPTHER

## 2024-01-16 ASSESSMENT — ANXIETY QUESTIONNAIRES
IF YOU CHECKED OFF ANY PROBLEMS ON THIS QUESTIONNAIRE, HOW DIFFICULT HAVE THESE PROBLEMS MADE IT FOR YOU TO DO YOUR WORK, TAKE CARE OF THINGS AT HOME, OR GET ALONG WITH OTHER PEOPLE: NOT DIFFICULT AT ALL
4. TROUBLE RELAXING: SEVERAL DAYS
1. FEELING NERVOUS, ANXIOUS, OR ON EDGE: SEVERAL DAYS
GAD7 TOTAL SCORE: 7
2. NOT BEING ABLE TO STOP OR CONTROL WORRYING: NOT AT ALL
6. BECOMING EASILY ANNOYED OR IRRITABLE: SEVERAL DAYS
3. WORRYING TOO MUCH ABOUT DIFFERENT THINGS: SEVERAL DAYS
5. BEING SO RESTLESS THAT IT IS HARD TO SIT STILL: MORE THAN HALF THE DAYS
7. FEELING AFRAID AS IF SOMETHING AWFUL MIGHT HAPPEN: SEVERAL DAYS

## 2024-01-16 ASSESSMENT — ENCOUNTER SYMPTOMS
CARDIOVASCULAR NEGATIVE: 1
NEUROLOGICAL NEGATIVE: 1
MUSCULOSKELETAL NEGATIVE: 1
RESPIRATORY NEGATIVE: 1
EYES NEGATIVE: 1
CONSTITUTIONAL NEGATIVE: 1
GASTROINTESTINAL NEGATIVE: 1

## 2024-01-16 ASSESSMENT — FIBROSIS 4 INDEX: FIB4 SCORE: 0.28

## 2024-01-16 ASSESSMENT — PATIENT HEALTH QUESTIONNAIRE - PHQ9: CLINICAL INTERPRETATION OF PHQ2 SCORE: 0

## 2024-01-16 NOTE — PROGRESS NOTES
"Evaluation completed by: Kashif Shaikh M.D.   Date of Service: 01/16/24   Appointment type: in-office appointment.  Attending:  Janelle Handley D.O  Information below was collected from: patient    CHIEF COMPLIANT:  Medication Management (Bipolar and OCD)        HPI:   Jerome La is a 23 y.o. old male who presents today for regularly scheduled follow up for assessment of Medication Management (Bipolar and OCD)  Patient was last seen on 10/31/23, at which time the plan was to restart Lamictal at 25mg and titrate to 100mg.    Since last appointment, patient reports that his mood has been \"good\" with increased stability and he is doing well functionally. 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 Patient reports that he usually sleeps approx 9-10 hours but slept approx 4-5 hours last night due to excitement of all his plans today.    Medication Management  Side effects: Denies any currently  Compliance: Reports full adherence    PSYCHIATRIC REVIEW OF SYSTEMS:current symptoms as reported by pt.  Depression: Denies depressed mood or anhedonia. Reports passive SI last in early January but without plan or intent. Denies SI currently. Denies access to firearms or sharps. Is future oriented.  Angelita: Denies any decreased need for sleep or change in mood.  Anxiety/Panic Attacks: Score of 7 on GAD7. Patient reports panic attacks previously 1-3 times daily before starting psychotherapy, but have no decreased to once every 1-2 weeks.  PTSD symptom: Patient reports no signs or symptoms indicative of PTSD  Psychosis: Patient reports no signs or symptoms indicative of psychosis  OCD: Reports that it is \"kind of\" well-controlled. Endorses obsessions of medical illnesses, things being \"just right\", and hoarding. See YBOCS media scan for details.    CURRENT MEDICATIONS    Current Outpatient " "Medications:     lamoTRIgine (LAMICTAL) 100 MG Tab, Take 1 Tablet by mouth every day for 180 days., Disp: 90 Tablet, Rfl: 1    quetiapine (SEROQUEL XR) 400 MG XR tablet, Take 1 Tablet by mouth every evening for 180 days., Disp: 90 Tablet, Rfl: 1    propranolol (INDERAL) 10 MG Tab, Take 1 Tablet by mouth 2 times a day as needed (As discussed, up to two times every day as needed for severe anxiety.) for up to 180 days., Disp: 180 Tablet, Rfl: 1    REVIEW OF SYSTEMS   Review of Systems   Constitutional: Negative.    HENT: Negative.     Eyes: Negative.    Respiratory: Negative.     Cardiovascular: Negative.    Gastrointestinal: Negative.    Musculoskeletal: Negative.    Skin: Negative.    Neurological: Negative.      Neurologic: no tics, tremors, dyskinesias. The patient denies dizzniess, syncope, falls. Ambulates independently    PAST MEDICAL HISTORY  Past Medical History:   Diagnosis Date    Bipolar 1 disorder (HCC)     PTSD (post-traumatic stress disorder)      No Known Allergies  History reviewed. No pertinent surgical history.     SOCIAL HX  No changes since initial evaluation on 7/25/23.    PSYCHIATRIC EXAMINATION   BP (!) 132/90 (BP Location: Right arm, Patient Position: Sitting, BP Cuff Size: Adult)   Pulse (!) 156 Comment: Pt stated they had panic attack before appt  Ht 1.892 m (6' 2.5\")   Wt 76.7 kg (169 lb)   SpO2 97%   BMI 21.41 kg/m² **asymptomatic tachycardia, no chest pain or shortness of breath  Musculoskeletal: No abnormal movements noted and wnl  Appearance: well-developed, well-nourished, appears stated age, fair hygiene, no apparent distress, thin, and appropriately dressed, cooperative, engaged, friendly, and pleasant  Thought Process:  linear, coherent, and goal-oriented  Abnormal or Psychotic Thoughts: Denies SI, denies HI, and no overt delusions noted  Speech: regular rate, rhythm, volume, tone, and syntax  Mood: \"good\"  Affect: euthymic and congruent with mood  SI/HI: Denies SI and " "HI  Orientation: alert and oriented  Recent and Remote Memory: no gross impairment in immediate, recent, or remote memory  Attention Span and Concentration: grossly intact, sufficient for conversation  Insight/Judgement into symptoms: good  Neurological Testing (MSSE Score and/or clock drawing): MMSE not performed during this encounter      SCREENINGS:      1/16/2024     9:30 AM   Depression Screen (PHQ-2/PHQ-9)   PHQ-2 Total Score 0         1/16/2024     9:49 AM    DAVID-7 ANXIETY SCALE FLOWSHEET   Feeling nervous, anxious, or on edge 1   Not being able to stop or control worrying 0   Worrying too much about different things 1   Trouble relaxing 1   Being so restless that it is hard to sit still 2   Becoming easily annoyed or irritable 1   Feeling afraid as if something awful might happen 1   DAVID-7 Total Score 7   How difficult have these problems made it for you to do your work, take care of things at home, or get along with other people? Not difficult at all       LABS:  No results found for: \"CHOLSTRLTOT\", \"TRIGLYCERIDE\", \"HDL\", \"LDL\"  Lab Results   Component Value Date/Time    SODIUM 142 09/25/2023 08:22 AM    POTASSIUM 4.2 09/25/2023 08:22 AM    CHLORIDE 106 09/25/2023 08:22 AM    CO2 24 09/25/2023 08:22 AM    ANION 12.0 09/25/2023 08:22 AM    GLUCOSE 100 (H) 09/25/2023 08:22 AM    BUN 11 09/25/2023 08:22 AM    CREATININE 1.02 09/25/2023 08:22 AM    CALCIUM 9.6 09/25/2023 08:22 AM    ASTSGOT 16 09/25/2023 08:22 AM    ALTSGPT 23 09/25/2023 08:22 AM    TBILIRUBIN 0.3 09/25/2023 08:22 AM    ALBUMIN 4.7 09/25/2023 08:22 AM    TOTPROTEIN 7.7 09/25/2023 08:22 AM    GLOBULIN 3.0 09/25/2023 08:22 AM    AGRATIO 1.6 09/25/2023 08:22 AM     Lab Results   Component Value Date/Time    WBC 8.4 09/25/2023 08:22 AM    RBC 5.75 09/25/2023 08:22 AM    HEMOGLOBIN 16.0 09/25/2023 08:22 AM    HEMATOCRIT 47.5 09/25/2023 08:22 AM    MCV 82.6 09/25/2023 08:22 AM    MCH 27.8 09/25/2023 08:22 AM    MCHC 33.7 09/25/2023 08:22 AM    RDW " "37.5 09/25/2023 08:22 AM    PLATELETCT 260 09/25/2023 08:22 AM    MPV 9.2 09/25/2023 08:22 AM    NEUTSPOLYS 50.50 09/25/2023 08:22 AM    LYMPHOCYTES 33.90 09/25/2023 08:22 AM    MONOCYTES 6.70 09/25/2023 08:22 AM    EOSINOPHILS 8.20 (H) 09/25/2023 08:22 AM    BASOPHILS 0.60 09/25/2023 08:22 AM    IMMGRAN 0.10 09/25/2023 08:22 AM    NRBC 0.00 09/25/2023 08:22 AM    NEUTS 4.22 09/25/2023 08:22 AM    MONOS 0.56 09/25/2023 08:22 AM    EOS 0.69 (H) 09/25/2023 08:22 AM    BASO 0.05 09/25/2023 08:22 AM    IMMGRANAB 0.01 09/25/2023 08:22 AM    NRBCAB 0.00 09/25/2023 08:22 AM     No results found for: \"HBA1C\", \"AVGLUC\"  Lab Results   Component Value Date/Time    TSHULTRASEN 2.050 05/25/2018 1904     Lab Results   Component Value Date/Time    FREET4 1.19 05/25/2018 1904     No results found for: \"25HYDROXY\"    PREVENTATIVE CARE  Medication Monitoring: Mood Stabilizers: Lamotrigine and antipsychotic Abilify Ordered CMP, CBC, lipids, A1C, and TSH. Reviewed drug interactions.       ASSESSMENT  Jerome La is a 23 y.o. old male who presents today for regularly scheduled follow up for assessment of Medication Management (Bipolar and OCD)  Patient was last seen on 10/31/23, at which time the plan was to restart Lamictal at 25mg and titrate to 100mg. Since last appt, patient reports stable mood and moderate control of anxiety and OCD symptoms on current regimen; offered further titration but patient declined at this time. Plan to continue medications at current dose and follow up in 8 weeks with updated labs in the interim.    [place]  records   reviewed.  No concerns about misuse of controlled substance.    CURRENT RISK ASSESSMENT       Suicide: Low       Homicide: Low       Self-Harm: Low       Relapse: Not applicable       Crisis Safety Plan Reviewed Not Indicated    DIAGNOSES/PLAN  Problem List Items Addressed This Visit          Psychiatry Problems    Generalized anxiety disorder     Status: Stable  Scored 7 on DAVID-7 " (decreased)  Medications:  -Continue Seroquel XR 400mg QHS; consider taper in future to reduce side effects  -Continue Lamictal 100mg daily  -Continue Propranolol 10mg PRN  Other:  -Establish with PCP  -Recommend psychotherapy         Severe mixed bipolar I disorder with psychotic features (HCC)     Status: Stable  Medications:  -Continue Seroquel XR 400mg QHS  -Continue Lamictal 100 mg  Other:  -Obtain outside records         Relevant Medications    lamoTRIgine (LAMICTAL) 100 MG Tab    quetiapine (SEROQUEL XR) 400 MG XR tablet    Obsessive compulsive disorder     As above          Other Visit Diagnoses       Long term current use of antipsychotic medication        Relevant Orders    CBC WITH DIFFERENTIAL    Comp Metabolic Panel    Lipid Profile    HEMOGLOBIN A1C    THYROID PANEL               Medication options, alternatives (including no medications) and medication risks/benefits/side effects were discussed in detail.  The patient was advised to call, message clinician on FaceTags, or come in to the clinic if symptoms worsen or if questions/issues regarding their medications arise.  The patient verbalized understanding and agreement.    The patient was educated to call 911, call the suicide hotline, or go to the local ER if having thoughts of suicide or homicide.  The patient verbalized understanding and agreement.   The proposed treatment plan was discussed with the patient who was provided the opportunity to ask questions and make suggestions regarding alternative treatment. Patient verbalized understanding and expressed agreement with the plan.      Return in about 8 weeks (around 3/12/2024).      This appointment was supervised by attending psychiatrist, Janelle Handley D.O, who agrees with assessment and treatment plan.  See attending attestation for more details.

## 2024-01-18 NOTE — ASSESSMENT & PLAN NOTE
Status: Stable  Medications:  -Continue Seroquel XR 400mg QHS  -Continue Lamictal 100 mg  Other:  -Obtain outside records

## 2024-01-18 NOTE — ASSESSMENT & PLAN NOTE
Status: Stable  Scored 7 on DAVID-7 (decreased)  Medications:  -Continue Seroquel XR 400mg QHS; consider taper in future to reduce side effects  -Continue Lamictal 100mg daily  -Continue Propranolol 10mg PRN  Other:  -Establish with PCP  -Recommend psychotherapy

## 2024-01-30 ENCOUNTER — OFFICE VISIT (OUTPATIENT)
Dept: CARDIOLOGY | Facility: MEDICAL CENTER | Age: 24
End: 2024-01-30
Attending: INTERNAL MEDICINE
Payer: COMMERCIAL

## 2024-01-30 VITALS
RESPIRATION RATE: 16 BRPM | BODY MASS INDEX: 21.69 KG/M2 | HEIGHT: 74 IN | OXYGEN SATURATION: 98 % | SYSTOLIC BLOOD PRESSURE: 122 MMHG | DIASTOLIC BLOOD PRESSURE: 68 MMHG | HEART RATE: 124 BPM | WEIGHT: 169 LBS

## 2024-01-30 DIAGNOSIS — R00.2 PALPITATIONS: ICD-10-CM

## 2024-01-30 PROCEDURE — 99211 OFF/OP EST MAY X REQ PHY/QHP: CPT | Performed by: INTERNAL MEDICINE

## 2024-01-30 PROCEDURE — 3074F SYST BP LT 130 MM HG: CPT | Performed by: INTERNAL MEDICINE

## 2024-01-30 PROCEDURE — 99203 OFFICE O/P NEW LOW 30 MIN: CPT | Performed by: INTERNAL MEDICINE

## 2024-01-30 PROCEDURE — 3078F DIAST BP <80 MM HG: CPT | Performed by: INTERNAL MEDICINE

## 2024-01-30 PROCEDURE — 93005 ELECTROCARDIOGRAM TRACING: CPT | Performed by: INTERNAL MEDICINE

## 2024-01-30 ASSESSMENT — FIBROSIS 4 INDEX: FIB4 SCORE: 0.28

## 2024-01-31 NOTE — ASSESSMENT & PLAN NOTE
Clinically, he is doing well and I have a very low clinical suspicion for any adverse cardiovascular etiology.  At this time I suspect that his tachycardias and palpitations that he is experiencing is related to his underlying anxiety.  His EKG does not demonstrate any overt abnormalities that are concerning.  I do not think that he has had a prior inferior myocardial infarction as noted on his EKG I suspect that this is more due to his body habitus.  I would not recommend any changes to his current medical therapy and he will continue to work closely with his psychiatrist and be followed in the cardiology department in 6 months

## 2024-01-31 NOTE — PROGRESS NOTES
[facility]    PatientName:Jerome RuffordDate: 2024  :2000    23 y.o.PCP:Pcp Pt States None  MRN:[id number]          Problems and Plans    Palpitations  Clinically, he is doing well and I have a very low clinical suspicion for any adverse cardiovascular etiology.  At this time I suspect that his tachycardias and palpitations that he is experiencing is related to his underlying anxiety.  His EKG does not demonstrate any overt abnormalities that are concerning.  I do not think that he has had a prior inferior myocardial infarction as noted on his EKG I suspect that this is more due to his body habitus.  I would not recommend any changes to his current medical therapy and he will continue to work closely with his psychiatrist and be followed in the cardiology department in 6 months    Return in about 6 months (around 2024).      Encounter    Reason for Visit / Chief Complaint: Tachycardia    HPI    23-year-old male with noted bipolar disorder, anxiety disorder, obsessive-compulsive disorder presents in consultation for evaluation of intermittent rapid heart rates.    He has been his usual state of health and was formally diagnosed with anxiety disorder back when he was in his early years as a teenager.  He ultimately wanted to get his heart checked out because he has had some intermittent rapid heart rates and was being followed closely by his psychiatrist who also recommended that he have formal evaluation.  He is accompanied by his mother who reiterates similar history.  He does have some occasional chest tightness that she recalls when he gets very anxious but often times his anxiety is before he is having chest tightness.  It improves with decreasing his overall anxiety.  Past Medical History  Past Medical History:   Diagnosis Date    Bipolar 1 disorder (HCC)     PTSD (post-traumatic stress disorder)      Past Surgical History  History reviewed. No pertinent  "surgical history.  Social History  Social History     Socioeconomic History    Marital status: Single     Spouse name: Not on file    Number of children: Not on file    Years of education: Not on file    Highest education level: Not on file   Occupational History    Not on file   Tobacco Use    Smoking status: Never    Smokeless tobacco: Never   Substance and Sexual Activity    Alcohol use: No    Drug use: No    Sexual activity: Not on file   Other Topics Concern    Not on file   Social History Narrative    Not on file     Social Determinants of Health     Financial Resource Strain: Not on file   Food Insecurity: Not on file   Transportation Needs: Not on file   Physical Activity: Not on file   Stress: Not on file   Social Connections: Not on file   Intimate Partner Violence: Not on file   Housing Stability: Not on file     Past Family History  Family History   Problem Relation Age of Onset    Depression Mother     Depression Father     OCD Father     Bipolar disorder Father     Depression Brother     Schizophrenia Cousin      Medication(s)    Current Outpatient Medications:     lamoTRIgine, 100 mg, Oral, DAILY, Taking    quetiapine, 400 mg, Oral, Nightly, Taking    propranolol, 10 mg, Oral, BID PRN, PRN  Allergies  Patient has no known allergies.    Review of Systems    A comprehensive 10 system review was conducted and is negative except as noted above in the HPI or here.      Vital Signs  /68 (BP Location: Left arm, Patient Position: Sitting, BP Cuff Size: Adult)   Pulse (!) 124   Resp 16   Ht 1.88 m (6' 2\")   Wt 76.7 kg (169 lb)   SpO2 98%   BMI 21.70 kg/m²     Physical Exam  Constitutional:       Appearance: Normal appearance. He is obese.   HENT:      Head: Normocephalic and atraumatic.      Mouth/Throat:      Mouth: Mucous membranes are moist.      Pharynx: Oropharynx is clear.   Eyes:      Extraocular Movements: Extraocular movements intact.      Conjunctiva/sclera: Conjunctivae normal. " "  Cardiovascular:      Rate and Rhythm: Normal rate and regular rhythm.      Pulses: Normal pulses.      Heart sounds: Normal heart sounds. No murmur heard.     No friction rub. No gallop.   Pulmonary:      Effort: Pulmonary effort is normal.      Breath sounds: Normal breath sounds.   Abdominal:      General: Bowel sounds are normal.      Palpations: Abdomen is soft.   Musculoskeletal:         General: Normal range of motion.      Cervical back: Normal range of motion and neck supple.   Skin:     General: Skin is warm and dry.   Neurological:      General: No focal deficit present.      Mental Status: He is alert and oriented to person, place, and time. Mental status is at baseline.   Psychiatric:         Mood and Affect: Mood normal.         Behavior: Behavior normal.         Thought Content: Thought content normal.         Judgment: Judgment normal.         Lab Results   Component Value Date/Time    TSHULTRASEN 2.050 05/25/2018 1904        Lab Results   Component Value Date/Time    FREET4 1.19 05/25/2018 1904      No results found for: \"HBA1C\"  No results found for: \"CHOLSTRLTOT\", \"LDL\", \"HDL\", \"TRIGLYCERIDE\"      Lab Results   Component Value Date/Time    SODIUM 142 09/25/2023 08:22 AM    POTASSIUM 4.2 09/25/2023 08:22 AM    CHLORIDE 106 09/25/2023 08:22 AM    CO2 24 09/25/2023 08:22 AM    GLUCOSE 100 (H) 09/25/2023 08:22 AM    BUN 11 09/25/2023 08:22 AM    CREATININE 1.02 09/25/2023 08:22 AM       Lab Results   Component Value Date/Time    ALKPHOSPHAT 70 09/25/2023 08:22 AM    ASTSGOT 16 09/25/2023 08:22 AM    ALTSGPT 23 09/25/2023 08:22 AM    TBILIRUBIN 0.3 09/25/2023 08:22 AM         Imaging  Sinus tachycardia with out evidence of an old inferior myocardial infarction.  I reviewed interpreted EKG.  Findings are discussed with the patient        Total patient time was estimated to be 30 minutes consisting of chart review, direct patient interaction, medication renewal, plan development and overall " communication with the cardiovascular team.        Electronically signed by:   Abilio Rodriguez DO, MPH  [facility]    Portions of this note were completed using voice recognition software (Dragon Naturally speaking software) . Occasional transcription errors may have escaped proof reading. I have made every reasonable attempt to correct obvious errors, but I expect that there are errors of grammar and possibly content that I did not discover before finalizing the note.

## 2024-02-05 LAB — EKG IMPRESSION: NORMAL

## 2024-02-05 PROCEDURE — 93010 ELECTROCARDIOGRAM REPORT: CPT | Performed by: INTERNAL MEDICINE

## 2024-03-05 ENCOUNTER — APPOINTMENT (OUTPATIENT)
Dept: BEHAVIORAL HEALTH | Facility: PSYCHIATRIC FACILITY | Age: 24
End: 2024-03-05
Payer: COMMERCIAL

## 2024-03-05 DIAGNOSIS — F31.64 SEVERE MIXED BIPOLAR I DISORDER WITH PSYCHOTIC FEATURES (HCC): ICD-10-CM

## 2024-03-05 RX ORDER — QUETIAPINE 400 MG/1
400 TABLET, FILM COATED, EXTENDED RELEASE ORAL NIGHTLY
Qty: 90 TABLET | Refills: 0 | Status: SHIPPED | OUTPATIENT
Start: 2024-03-05 | End: 2024-06-03

## 2024-03-05 RX ORDER — LAMOTRIGINE 100 MG/1
100 TABLET ORAL DAILY
Qty: 90 TABLET | Refills: 0 | Status: SHIPPED | OUTPATIENT
Start: 2024-03-05 | End: 2024-06-03

## 2024-04-02 ENCOUNTER — APPOINTMENT (OUTPATIENT)
Dept: BEHAVIORAL HEALTH | Facility: PSYCHIATRIC FACILITY | Age: 24
End: 2024-04-02
Payer: COMMERCIAL

## 2024-04-02 DIAGNOSIS — F31.64 SEVERE MIXED BIPOLAR I DISORDER WITH PSYCHOTIC FEATURES (HCC): ICD-10-CM

## 2024-04-02 DIAGNOSIS — F42.9 OBSESSIVE-COMPULSIVE DISORDER, UNSPECIFIED TYPE: ICD-10-CM

## 2024-04-02 PROCEDURE — 3074F SYST BP LT 130 MM HG: CPT

## 2024-04-02 PROCEDURE — 3079F DIAST BP 80-89 MM HG: CPT

## 2024-04-02 PROCEDURE — 99214 OFFICE O/P EST MOD 30 MIN: CPT | Mod: GC

## 2024-04-02 RX ORDER — LAMOTRIGINE 100 MG/1
TABLET ORAL
Qty: 25 TABLET | Refills: 0 | Status: SHIPPED | OUTPATIENT
Start: 2024-04-02 | End: 2024-04-16

## 2024-04-02 ASSESSMENT — FIBROSIS 4 INDEX: FIB4 SCORE: 0.28
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
## 2024-04-02 NOTE — PATIENT INSTRUCTIONS
Lamictal:  -Take 1.5 tablets for 7 days (for total dose of 150mg)  -THEN take 2 tablets for the next 7 days if not having any side effects (200mg)  -Complete lab work

## 2024-04-03 VITALS
DIASTOLIC BLOOD PRESSURE: 90 MMHG | BODY MASS INDEX: 20.97 KG/M2 | WEIGHT: 163.4 LBS | HEART RATE: 124 BPM | SYSTOLIC BLOOD PRESSURE: 123 MMHG | HEIGHT: 74 IN | OXYGEN SATURATION: 98 %

## 2024-04-03 ASSESSMENT — ENCOUNTER SYMPTOMS
NEUROLOGICAL NEGATIVE: 1
EYES NEGATIVE: 1
CARDIOVASCULAR NEGATIVE: 1
CONSTITUTIONAL NEGATIVE: 1
MUSCULOSKELETAL NEGATIVE: 1
GASTROINTESTINAL NEGATIVE: 1
RESPIRATORY NEGATIVE: 1

## 2024-04-03 NOTE — ASSESSMENT & PLAN NOTE
Status: Unstable  Medications:  -Continue Seroquel XR 400mg QHS  -INCREASE Lamictal to 150 mg for 7 days starting on 4/2/24; THEN INCREASE to 200 mg until next appt.  Other:  -Obtain outside records

## 2024-06-10 DIAGNOSIS — F31.64 SEVERE MIXED BIPOLAR I DISORDER WITH PSYCHOTIC FEATURES (HCC): ICD-10-CM

## 2024-06-12 RX ORDER — QUETIAPINE 400 MG/1
400 TABLET, FILM COATED, EXTENDED RELEASE ORAL EVERY EVENING
Qty: 90 TABLET | Refills: 0 | Status: SHIPPED | OUTPATIENT
Start: 2024-06-12 | End: 2024-06-25 | Stop reason: SDUPTHER

## 2024-06-25 ENCOUNTER — APPOINTMENT (OUTPATIENT)
Dept: BEHAVIORAL HEALTH | Facility: PSYCHIATRIC FACILITY | Age: 24
End: 2024-06-25
Payer: COMMERCIAL

## 2024-06-25 VITALS
SYSTOLIC BLOOD PRESSURE: 124 MMHG | BODY MASS INDEX: 20.15 KG/M2 | DIASTOLIC BLOOD PRESSURE: 70 MMHG | HEART RATE: 132 BPM | OXYGEN SATURATION: 97 % | WEIGHT: 157 LBS | HEIGHT: 74 IN

## 2024-06-25 DIAGNOSIS — F42.9 OBSESSIVE-COMPULSIVE DISORDER, UNSPECIFIED TYPE: ICD-10-CM

## 2024-06-25 DIAGNOSIS — F31.64 SEVERE MIXED BIPOLAR I DISORDER WITH PSYCHOTIC FEATURES (HCC): ICD-10-CM

## 2024-06-25 RX ORDER — LAMOTRIGINE 200 MG/1
200 TABLET ORAL DAILY
Qty: 90 TABLET | Refills: 0 | Status: SHIPPED | OUTPATIENT
Start: 2024-06-25 | End: 2024-09-23

## 2024-06-25 RX ORDER — QUETIAPINE 400 MG/1
400 TABLET, FILM COATED, EXTENDED RELEASE ORAL EVERY EVENING
Qty: 90 TABLET | Refills: 0 | Status: SHIPPED | OUTPATIENT
Start: 2024-06-25

## 2024-06-25 ASSESSMENT — ENCOUNTER SYMPTOMS
CARDIOVASCULAR NEGATIVE: 1
GASTROINTESTINAL NEGATIVE: 1
RESPIRATORY NEGATIVE: 1
MUSCULOSKELETAL NEGATIVE: 1
NEUROLOGICAL NEGATIVE: 1
CONSTITUTIONAL NEGATIVE: 1

## 2024-06-25 ASSESSMENT — ANXIETY QUESTIONNAIRES
1. FEELING NERVOUS, ANXIOUS, OR ON EDGE: SEVERAL DAYS
4. TROUBLE RELAXING: MORE THAN HALF THE DAYS
2. NOT BEING ABLE TO STOP OR CONTROL WORRYING: MORE THAN HALF THE DAYS
3. WORRYING TOO MUCH ABOUT DIFFERENT THINGS: SEVERAL DAYS
5. BEING SO RESTLESS THAT IT IS HARD TO SIT STILL: SEVERAL DAYS
7. FEELING AFRAID AS IF SOMETHING AWFUL MIGHT HAPPEN: NEARLY EVERY DAY
6. BECOMING EASILY ANNOYED OR IRRITABLE: NOT AT ALL
GAD7 TOTAL SCORE: 10

## 2024-06-25 ASSESSMENT — FIBROSIS 4 INDEX: FIB4 SCORE: 0.28
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
## 2024-06-26 NOTE — ASSESSMENT & PLAN NOTE
Status: Unstable  Medications:  -Continue Seroquel XR 400mg QHS  -Continue Lamictal 200 mg daily  Other:  -Obtain outside records

## 2024-08-16 ENCOUNTER — APPOINTMENT (OUTPATIENT)
Dept: CARDIOLOGY | Facility: MEDICAL CENTER | Age: 24
End: 2024-08-16
Attending: INTERNAL MEDICINE
Payer: COMMERCIAL

## 2024-08-27 ENCOUNTER — OFFICE VISIT (OUTPATIENT)
Dept: BEHAVIORAL HEALTH | Facility: PSYCHIATRIC FACILITY | Age: 24
End: 2024-08-27
Payer: COMMERCIAL

## 2024-08-27 VITALS
BODY MASS INDEX: 19.25 KG/M2 | WEIGHT: 150 LBS | DIASTOLIC BLOOD PRESSURE: 70 MMHG | HEIGHT: 74 IN | SYSTOLIC BLOOD PRESSURE: 112 MMHG | OXYGEN SATURATION: 93 % | HEART RATE: 93 BPM

## 2024-08-27 DIAGNOSIS — Z79.899 LONG TERM CURRENT USE OF ANTIPSYCHOTIC MEDICATION: ICD-10-CM

## 2024-08-27 DIAGNOSIS — F31.64 SEVERE MIXED BIPOLAR I DISORDER WITH PSYCHOTIC FEATURES (HCC): ICD-10-CM

## 2024-08-27 DIAGNOSIS — Z79.899 HIGH RISK MEDICATION USE: ICD-10-CM

## 2024-08-27 PROCEDURE — 3074F SYST BP LT 130 MM HG: CPT

## 2024-08-27 PROCEDURE — 3078F DIAST BP <80 MM HG: CPT

## 2024-08-27 PROCEDURE — 99213 OFFICE O/P EST LOW 20 MIN: CPT

## 2024-08-27 ASSESSMENT — ENCOUNTER SYMPTOMS
ABDOMINAL PAIN: 0
VOMITING: 0
NAUSEA: 0
DOUBLE VISION: 0
BLURRED VISION: 0
SHORTNESS OF BREATH: 0
DIARRHEA: 0
CONSTIPATION: 0

## 2024-08-27 ASSESSMENT — FIBROSIS 4 INDEX: FIB4 SCORE: 0.28

## 2024-08-27 NOTE — PATIENT INSTRUCTIONS
Continue Seroquel  mg in the evening  Continue Lamictal 200 mg daily. Call if you miss more than three days of dosing of this medication  Complete Labs ASAP within the next few weeks     Return in 2-3 months
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
## 2024-08-28 RX ORDER — LAMOTRIGINE 200 MG/1
200 TABLET ORAL DAILY
Qty: 90 TABLET | Refills: 0 | Status: SHIPPED | OUTPATIENT
Start: 2024-08-28 | End: 2024-11-26

## 2024-08-28 RX ORDER — QUETIAPINE 400 MG/1
400 TABLET, FILM COATED, EXTENDED RELEASE ORAL EVERY EVENING
Qty: 90 TABLET | Refills: 0 | Status: SHIPPED | OUTPATIENT
Start: 2024-08-28

## 2024-08-31 NOTE — ASSESSMENT & PLAN NOTE
Problem type: Chronic Illness, Stable    Assessment:   Mr. La is a 23-year-old male seen today for follow-up of bipolar disorder    The patient endorses continued mood stability on current doses of Lamictal and Seroquel.  He reports he has not had an episode of depression in a long time and also has not been having any manic or hypomanic symptoms.  Sleep is usually good unless he is anxious about an event the next day.  He reports he only has poor sleep maybe once every several weeks.  He denies any thoughts of harming self or others.  He has not been having any hallucinations and does not express delusions.  He is denying any current side effects from his medications    Plan  Medication:   Continue Seroquel  mg at bedtime for insomnia and mood stabilization  Continue Lamictal 200 mg at bedtime for mood stabilization.  Patient reminded not to miss more than 3 doses of this medication and if this occurs to please contact the clinic as soon as possible for redosing instructions.    Therapy: Not applicable    Other Orders: CBC, CMP, lipid panel, A1c and thyroid panel all ordered today for medication monitoring as the patient is on an antipsychotic medication

## 2024-11-12 ENCOUNTER — OFFICE VISIT (OUTPATIENT)
Dept: BEHAVIORAL HEALTH | Facility: PSYCHIATRIC FACILITY | Age: 24
End: 2024-11-12
Payer: COMMERCIAL

## 2024-11-12 VITALS
OXYGEN SATURATION: 98 % | HEIGHT: 74 IN | SYSTOLIC BLOOD PRESSURE: 118 MMHG | WEIGHT: 158 LBS | BODY MASS INDEX: 20.28 KG/M2 | HEART RATE: 96 BPM | DIASTOLIC BLOOD PRESSURE: 72 MMHG

## 2024-11-12 DIAGNOSIS — F31.64 SEVERE MIXED BIPOLAR I DISORDER WITH PSYCHOTIC FEATURES (HCC): ICD-10-CM

## 2024-11-12 DIAGNOSIS — F42.9 OBSESSIVE-COMPULSIVE DISORDER, UNSPECIFIED TYPE: ICD-10-CM

## 2024-11-12 ASSESSMENT — ENCOUNTER SYMPTOMS
NAUSEA: 0
SHORTNESS OF BREATH: 0
RESPIRATORY NEGATIVE: 1
CONSTITUTIONAL NEGATIVE: 1
CONSTIPATION: 0
CARDIOVASCULAR NEGATIVE: 1
GASTROINTESTINAL NEGATIVE: 1
VOMITING: 0
TINGLING: 0
HEADACHES: 0
WEAKNESS: 0
DIARRHEA: 0
NEUROLOGICAL NEGATIVE: 1

## 2024-11-12 ASSESSMENT — FIBROSIS 4 INDEX: FIB4 SCORE: 0.28

## 2024-11-12 NOTE — ASSESSMENT & PLAN NOTE
Problem type: Chronic Illness, Stable    Assessment: 23 year old male with hx of BDI and OCD who presents for follow up. Ongoing mood stability and absence of psychotic sxs on current medication regimen; tolerating without any side effects. No evidence of depressive episode currently. No acute safety concerns. Patient reports mostly regular sleep schedule. Patient has increased his socialization and level of function in the past 6 months. This has also resulted in reduced anxiety and obsessive thoughts. Plan to continue medications as outlined below and follow up in 3 months to discuss neurodevelopmental disorders (as requested by patient).    Plan  Medication:   -Continue Seroquel XR 400mg QHS  -Continue Lamictal 200 mg daily    Therapy: none; consider long-term psychotherapy    Other Orders:   -Reminded to complete drug monitoring labs
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
## 2025-01-14 ENCOUNTER — OFFICE VISIT (OUTPATIENT)
Dept: BEHAVIORAL HEALTH | Facility: PSYCHIATRIC FACILITY | Age: 25
End: 2025-01-14
Payer: COMMERCIAL

## 2025-01-14 VITALS
SYSTOLIC BLOOD PRESSURE: 120 MMHG | DIASTOLIC BLOOD PRESSURE: 72 MMHG | WEIGHT: 160 LBS | HEART RATE: 99 BPM | HEIGHT: 74 IN | OXYGEN SATURATION: 98 % | BODY MASS INDEX: 20.53 KG/M2

## 2025-01-14 DIAGNOSIS — F31.64 SEVERE MIXED BIPOLAR I DISORDER WITH PSYCHOTIC FEATURES (HCC): ICD-10-CM

## 2025-01-14 DIAGNOSIS — F42.9 OBSESSIVE-COMPULSIVE DISORDER, UNSPECIFIED TYPE: ICD-10-CM

## 2025-01-14 PROCEDURE — 3078F DIAST BP <80 MM HG: CPT

## 2025-01-14 PROCEDURE — 99214 OFFICE O/P EST MOD 30 MIN: CPT

## 2025-01-14 PROCEDURE — 3074F SYST BP LT 130 MM HG: CPT

## 2025-01-14 ASSESSMENT — ENCOUNTER SYMPTOMS
GASTROINTESTINAL NEGATIVE: 1
CARDIOVASCULAR NEGATIVE: 1
TINGLING: 0
HEADACHES: 0
WEAKNESS: 0
SHORTNESS OF BREATH: 0
NAUSEA: 0
CONSTIPATION: 0
VOMITING: 0
RESPIRATORY NEGATIVE: 1
NEUROLOGICAL NEGATIVE: 1
CONSTITUTIONAL NEGATIVE: 1
DIARRHEA: 0

## 2025-01-14 ASSESSMENT — FIBROSIS 4 INDEX: FIB4 SCORE: 0.3
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
## 2025-01-15 RX ORDER — LAMOTRIGINE 200 MG/1
200 TABLET ORAL DAILY
Qty: 90 TABLET | Refills: 1 | Status: SHIPPED | OUTPATIENT
Start: 2025-01-15 | End: 2025-07-14

## 2025-01-15 RX ORDER — QUETIAPINE 400 MG/1
400 TABLET, FILM COATED, EXTENDED RELEASE ORAL EVERY EVENING
Qty: 90 TABLET | Refills: 0 | Status: SHIPPED | OUTPATIENT
Start: 2025-01-15

## 2025-01-15 NOTE — ASSESSMENT & PLAN NOTE
As above    Other: Consider future discussion/evaluation for ASD, as requested by patient at previous visits

## 2025-01-15 NOTE — ASSESSMENT & PLAN NOTE
Problem type: Chronic Illness, Stable    Assessment: 24 year old male with hx of BDI and OCD who presents for follow up. Ongoing mood stability and absence of psychotic sxs on current medication regimen; tolerating without any side effects. No evidence of manic or depressive episode currently. No acute safety concerns. Patient reports mostly regular sleep schedule. Patient has increased his socialization and level of function drastically in the past year. This has also resulted in reduced anxiety and obsessive/intrusive thoughts. Plan to continue medications as outlined below and follow up in 3 months.    Plan  Medication:   -Continue Seroquel XR 400mg QHS  -Continue Lamictal 200 mg daily    Therapy: none; consider long-term psychotherapy    Other Orders:   -Reminded again to complete drug monitoring labs (several months overdue)

## 2025-04-15 ENCOUNTER — OFFICE VISIT (OUTPATIENT)
Dept: BEHAVIORAL HEALTH | Facility: PSYCHIATRIC FACILITY | Age: 25
End: 2025-04-15
Payer: COMMERCIAL

## 2025-04-15 VITALS
SYSTOLIC BLOOD PRESSURE: 122 MMHG | WEIGHT: 164.4 LBS | HEIGHT: 74 IN | OXYGEN SATURATION: 94 % | BODY MASS INDEX: 21.1 KG/M2 | HEART RATE: 50 BPM | DIASTOLIC BLOOD PRESSURE: 68 MMHG

## 2025-04-15 DIAGNOSIS — F31.64 SEVERE MIXED BIPOLAR I DISORDER WITH PSYCHOTIC FEATURES (HCC): ICD-10-CM

## 2025-04-15 DIAGNOSIS — Z79.899 HIGH RISK MEDICATION USE: ICD-10-CM

## 2025-04-15 DIAGNOSIS — F42.9 OBSESSIVE-COMPULSIVE DISORDER, UNSPECIFIED TYPE: ICD-10-CM

## 2025-04-15 DIAGNOSIS — Z79.899 LONG TERM CURRENT USE OF ANTIPSYCHOTIC MEDICATION: ICD-10-CM

## 2025-04-15 PROCEDURE — 99999 PR NO CHARGE: CPT

## 2025-04-15 RX ORDER — LAMOTRIGINE 200 MG/1
200 TABLET ORAL DAILY
Qty: 90 TABLET | Refills: 1 | Status: SHIPPED | OUTPATIENT
Start: 2025-04-15 | End: 2025-10-12

## 2025-04-15 RX ORDER — QUETIAPINE 400 MG/1
400 TABLET, FILM COATED, EXTENDED RELEASE ORAL EVERY EVENING
Qty: 90 TABLET | Refills: 0 | Status: SHIPPED | OUTPATIENT
Start: 2025-04-15

## 2025-04-15 ASSESSMENT — ENCOUNTER SYMPTOMS
SHORTNESS OF BREATH: 0
TINGLING: 0
DIARRHEA: 0
NAUSEA: 0
NEUROLOGICAL NEGATIVE: 1
WEAKNESS: 0
VOMITING: 0
HEADACHES: 0
CONSTIPATION: 0
CONSTITUTIONAL NEGATIVE: 1
GASTROINTESTINAL NEGATIVE: 1
CARDIOVASCULAR NEGATIVE: 1
RESPIRATORY NEGATIVE: 1

## 2025-04-15 ASSESSMENT — FIBROSIS 4 INDEX: FIB4 SCORE: 0.35

## 2025-04-15 NOTE — ASSESSMENT & PLAN NOTE
Problem type: Chronic Illness, Stable    Assessment: 24 year old male with hx of well-documented BDI (manic episodes with psychotic symptoms) and OCD who presents for follow up. Ongoing mood stability and absence of psychotic sxs on current medication regimen; tolerating without any side effects. No evidence of manic or depressive episode currently. No acute safety concerns. Patient reports mostly regular sleep schedule. Patient has increased his socialization and level of function drastically in the past year. This has also resulted in reduced anxiety and obsessive/intrusive thoughts. Plan to continue medications as outlined below and follow up in 3 months to transfer to a new resident given long-term stability.    Plan  Medication:   -Continue Seroquel XR 400mg QHS  -Continue Lamictal 200 mg daily    Therapy: none; consider long-term psychotherapy    Other Orders:   - Reviewed annual drug monitoring labs which were all within normal limits
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
## 2025-06-09 ENCOUNTER — OFFICE VISIT (OUTPATIENT)
Dept: BEHAVIORAL HEALTH | Facility: PSYCHIATRIC FACILITY | Age: 25
End: 2025-06-09
Payer: COMMERCIAL

## 2025-06-09 DIAGNOSIS — F31.64 SEVERE MIXED BIPOLAR I DISORDER WITH PSYCHOTIC FEATURES (HCC): ICD-10-CM

## 2025-06-09 DIAGNOSIS — F42.9 OBSESSIVE-COMPULSIVE DISORDER, UNSPECIFIED TYPE: Primary | ICD-10-CM

## 2025-06-09 PROCEDURE — 99999 PR NO CHARGE: CPT

## 2025-06-09 NOTE — PATIENT INSTRUCTIONS
[email or web address]    [email or web address]    Therapy in a Nutshell    Crisis Plan:  Calling clinic. Calling a 24-hour crisis hotline number 988. Calling 911. Utilizing the ED in the event of an emergency.     1-800-QUIT-NOW (927-775-7593) or enroll online at www.Reframe It    [place] [facility]    [place] [facility]    [facility]  Address: [address] [place] [address]  Phone: [phone]  Notes: [facility] is a consortium of private practitioners who advertise together, and are in business separately as the following: Melania Soto, Marriage and Family Therapist, Licensed Clinical Alcohol & Drug Counselor; Zhane Pacheco, [facility] Practitioner; Lisset Mercado, Occupational Therapist and Reiki Master; Jesenia Oleary, Marriage and Family Therapist  [facility]  Address: [address] [place] [address] [place] [address]  Phone: [phone]  Notes: ADHD skills building, ACT Therapy, CBT, EMDR, Trauma focused therapy, Play based therapy, Family therapy  [facility]  Address: [address], [place] [address]  Phone: [phone]  Notes: Depression, Anxiety and stress management, marriage and family, DBT, EMDR  [facility]  Address: [address] [place] [address]  Phone: [phone]  Notes: wellness, mindfulness, self-care, LGBTQ+, goal setting and achievement   [facility]  Address: [address], [place] [address]  Phone: [phone]  Notes: Cognitive Behavioral Therapy (CBT), Dialectical Behavioral Therapy (DBT), Emotionally Focused Therapy (EFT), Gottman Couples Therapy, Positive Discipline and Child Education, Play Therapy  [facility]  Address: [address] [address]  Phone: [phone]  Notes: substance abuse, domestic violence, anger management, marriage and family counseling, mindfulness as well as Substance Abuse and Anger Management/Domestic Violence  Groups              [facility]: eMlania Elizalde Psy.D, Ruth Causey LCSW, Albina Liz, PhD  Address: [address] [facility] [address]  Phone: [phone]  Notes: sleep disorders and health related concerns such as chronic pain, depression and anxiety  Christopher Ly, Ph.D at [facility]  Address: [address], [address], [place] [address]  Phone: [phone]  Notes: sport psychology, resilience, motivation, self-talk, confidence  [facility]  Address: [address], [address], [facility], [address]  Phone: [phone]  Notes: Certified [facility] ([facility]), offering peer services, case management, crisis intervention, Basic Skills Training, and Psycho-Social Rehabilitation; provides MAT for adolescent and adult opioid users, family therapy   [facility]  Address: [address] [place] [address]  Phone: [phone]  Notes: Many group class options, Domestic Violence, Anger Management, Kazakh speaking, Substance Abuse, Peaceful families parenting, Family therapy, Sliding scale fees  [facility]  Address: [address] [place] [address]  Phone: [phone]   Notes: EMDR, Kazakh speaking, Psychedelic assisted therapy, couples and family counseling  [facility]  Address: [address] [place] [address]  Phone: [phone]  Notes: Testing: Cognitive evaluations, Learning disability evaluations, ADHD evaluations, Pre-employment evaluations, Fitness for duty evaluations, Bariatric surgery evaluations, Pain procedure evaluations  [facility]  Address: [address], [place] [address]  Phone: [phone]  Notes: Borderline Personality Disorder, EMDR, Grief Counseling, Couples/family/parenting

## 2025-07-21 ENCOUNTER — OFFICE VISIT (OUTPATIENT)
Dept: BEHAVIORAL HEALTH | Facility: PSYCHIATRIC FACILITY | Age: 25
End: 2025-07-21
Payer: COMMERCIAL

## 2025-07-21 VITALS
OXYGEN SATURATION: 99 % | BODY MASS INDEX: 21.51 KG/M2 | HEART RATE: 92 BPM | SYSTOLIC BLOOD PRESSURE: 110 MMHG | HEIGHT: 74 IN | DIASTOLIC BLOOD PRESSURE: 86 MMHG | WEIGHT: 167.6 LBS

## 2025-07-21 DIAGNOSIS — F43.12 CHRONIC POST-TRAUMATIC STRESS DISORDER (PTSD): ICD-10-CM

## 2025-07-21 DIAGNOSIS — F41.1 GENERALIZED ANXIETY DISORDER: ICD-10-CM

## 2025-07-21 DIAGNOSIS — F31.64 SEVERE MIXED BIPOLAR I DISORDER WITH PSYCHOTIC FEATURES (HCC): ICD-10-CM

## 2025-07-21 DIAGNOSIS — Z79.899 LONG TERM CURRENT USE OF ANTIPSYCHOTIC MEDICATION: ICD-10-CM

## 2025-07-21 DIAGNOSIS — F25.9 SCHIZOAFFECTIVE DISORDER, UNSPECIFIED TYPE (HCC): ICD-10-CM

## 2025-07-21 DIAGNOSIS — F60.3 BORDERLINE PERSONALITY DISORDER (HCC): ICD-10-CM

## 2025-07-21 DIAGNOSIS — Z79.899 HIGH RISK MEDICATION USE: ICD-10-CM

## 2025-07-21 DIAGNOSIS — F42.9 OBSESSIVE-COMPULSIVE DISORDER, UNSPECIFIED TYPE: Primary | ICD-10-CM

## 2025-07-21 ASSESSMENT — FIBROSIS 4 INDEX: FIB4 SCORE: 0.35
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
## 2025-07-22 NOTE — Clinical Note
REFERRAL APPROVAL NOTICE         Sent on July 21, 2025                   Jerome La  [address]  [address]  [place] [address]                   Dear Mr. La,    After a careful review of the medical information and benefit coverage, [facility] has processed your referral. See below for additional details.    If applicable, you must be actively enrolled with your insurance for coverage of the authorized service. If you have any questions regarding your coverage, please contact your insurance directly.    REFERRAL INFORMATION   Referral #:  [id number]  Referred-To Provider    Referred-By Provider:       Pallavi Smith M.D.   [facility]      [address]  [address]  [place] [address]  [phone] [id number]  [place] [address]  [phone]    Referral Start Date:  07/21/2025  Referral End Date:   07/21/2026             SCHEDULING  If you do not already have an appointment, please call [phone] to make an appointment.     MORE INFORMATION  If you do not already have a Welcome Real-time account, sign up at: [email or web address]  You can access your medical information, make appointments, see lab results, billing information, and more.  If you have questions regarding this referral, please contact  the [facility] Referrals department at:             [phone]. Monday - Friday 8:00AM - 5:00PM.     Sincerely,    [facility]